# Patient Record
Sex: MALE | Race: BLACK OR AFRICAN AMERICAN | NOT HISPANIC OR LATINO | Employment: FULL TIME | URBAN - METROPOLITAN AREA
[De-identification: names, ages, dates, MRNs, and addresses within clinical notes are randomized per-mention and may not be internally consistent; named-entity substitution may affect disease eponyms.]

---

## 2018-08-07 ENCOUNTER — OFFICE VISIT (OUTPATIENT)
Dept: FAMILY MEDICINE | Facility: CLINIC | Age: 33
End: 2018-08-07
Payer: COMMERCIAL

## 2018-08-07 VITALS
HEIGHT: 64 IN | SYSTOLIC BLOOD PRESSURE: 118 MMHG | WEIGHT: 141 LBS | DIASTOLIC BLOOD PRESSURE: 74 MMHG | TEMPERATURE: 98.2 F | BODY MASS INDEX: 24.07 KG/M2 | HEART RATE: 68 BPM

## 2018-08-07 DIAGNOSIS — Z11.4 SCREENING FOR HIV (HUMAN IMMUNODEFICIENCY VIRUS): ICD-10-CM

## 2018-08-07 DIAGNOSIS — Z00.01 ENCOUNTER FOR ROUTINE ADULT HEALTH EXAMINATION WITH ABNORMAL FINDINGS: Primary | ICD-10-CM

## 2018-08-07 DIAGNOSIS — Z13.1 SCREENING FOR DIABETES MELLITUS: ICD-10-CM

## 2018-08-07 DIAGNOSIS — K21.00 GASTROESOPHAGEAL REFLUX DISEASE WITH ESOPHAGITIS: ICD-10-CM

## 2018-08-07 DIAGNOSIS — Z13.220 LIPID SCREENING: ICD-10-CM

## 2018-08-07 PROCEDURE — 80061 LIPID PANEL: CPT | Performed by: FAMILY MEDICINE

## 2018-08-07 PROCEDURE — 36415 COLL VENOUS BLD VENIPUNCTURE: CPT | Performed by: FAMILY MEDICINE

## 2018-08-07 PROCEDURE — 99395 PREV VISIT EST AGE 18-39: CPT | Performed by: FAMILY MEDICINE

## 2018-08-07 PROCEDURE — 82947 ASSAY GLUCOSE BLOOD QUANT: CPT | Performed by: FAMILY MEDICINE

## 2018-08-07 PROCEDURE — 87389 HIV-1 AG W/HIV-1&-2 AB AG IA: CPT | Performed by: FAMILY MEDICINE

## 2018-08-07 ASSESSMENT — ENCOUNTER SYMPTOMS
ARTHRALGIAS: 0
HEMATURIA: 0
HEMATOCHEZIA: 0
CHILLS: 0
FEVER: 0
WEAKNESS: 0
PARESTHESIAS: 0
DIARRHEA: 0
NERVOUS/ANXIOUS: 0
EYE PAIN: 0
MYALGIAS: 0
DIZZINESS: 0
HEADACHES: 0
PALPITATIONS: 0
FREQUENCY: 0
COUGH: 0
NAUSEA: 0
SHORTNESS OF BREATH: 0
SORE THROAT: 0
JOINT SWELLING: 0
ABDOMINAL PAIN: 0
DYSURIA: 0

## 2018-08-07 NOTE — LETTER
[facility]  [address] [place] [id number]  [phone]                                                                                                August 13, 2018    Ric Chendotjonathan  [facility] [address] [place] [address]  [place] [address]        Dear Mr. Izaguirre,    Your recent lab results were within normal limits. A copy of those results are included with this letter.      Sincerely,      Yesenia Silva, DO/hl    Results for orders placed or performed in visit on 08/07/18   HIV Screening   Result Value Ref Range    HIV Antigen Antibody Combo Nonreactive NR^Nonreactive       Lipid panel reflex to direct LDL Fasting   Result Value Ref Range    Cholesterol 119 <200 mg/dL    Triglycerides 55 <150 mg/dL    HDL Cholesterol 42 >39 mg/dL    LDL Cholesterol Calculated 66 <100 mg/dL    Non HDL Cholesterol 77 <130 mg/dL   Glucose   Result Value Ref Range    Glucose 80 70 - 99 mg/dL

## 2018-08-07 NOTE — PATIENT INSTRUCTIONS
Preventive Health Recommendations  Male Ages 26 - 39    Yearly exam:             See your health care provider every year in order to  o   Review health changes.   o   Discuss preventive care.    o   Review your medicines if your doctor has prescribed any.    You should be tested each year for STDs (sexually transmitted diseases), if you re at risk.     After age 35, talk to your provider about cholesterol testing. If you are at risk for heart disease, have your cholesterol tested at least every 5 years.     If you are at risk for diabetes, you should have a diabetes test (fasting glucose).  Shots: Get a flu shot each year. Get a tetanus shot every 10 years.     Nutrition:    Eat at least 5 servings of fruits and vegetables daily.     Eat whole-grain bread, whole-wheat pasta and brown rice instead of white grains and rice.     Get adequate Calcium and Vitamin D.     Lifestyle    Exercise for at least 150 minutes a week (30 minutes a day, 5 days a week). This will help you control your weight and prevent disease.     Limit alcohol to one drink per day.     No smoking.     Wear sunscreen to prevent skin cancer.     See your dentist every six months for an exam and cleaning.   [facility]   Discharged by : Mickie GARSIA CMA ([facility])    Paper scripts provided to patient : none      If you have any questions regarding your visit please contact your care team:     Team Gold                Clinic Hours Telephone Number     Dr. Raine Hannah, CNP 7am-7pm  Monday - Thursday   7am-5pm  Fridays  [phone]   (Appointment scheduling available 24/7)     RN Line  [phone] option 2     Urgent Care - [place] - 11am-9pm Monday-Friday Saturday-Sunday- 9am-5pm     [place] -   5pm-9pm Monday-Friday Saturday-Sunday- 9am-5pm    [phone] - Bela Aquino    [phone] - [place]       For a Price Quote for your  services, please call our Consumer Price Line at [phone].     What options do I have for visits at the clinic other than the traditional office visit?     To expand how we care for you, many of our providers are utilizing electronic visits (e-visits) and telephone visits, when medically appropriate, for interactions with their patients rather than a visit in the clinic. We also offer nurse visits for many medical concerns. Just like any other service, we will bill your insurance company for this type of visit based on time spent on the phone with your provider. Not all insurance companies cover these visits. Please check with your medical insurance if this type of visit is covered. You will be responsible for any charges that are not paid by your insurance.   E-visits via [employer]: generally incur a $35.00 fee.     Telephone visits:  Time spent on the phone: *charged based on time that is spent on the phone in increments of 10 minutes. Estimated cost:   5-10 mins $30.00   11-20 mins. $59.00   21-30 mins. $85.00       Use [employer] (secure email communication and access to your chart) to send your primary care provider a message or make an appointment. Ask someone on your Team how to sign up for [employer].     As always, Thank you for trusting us with your health care needs!      [facility] [place] [facility]:    Scheduling Appointments  [place] [place]  Call: [phone]    [facility]  Call: [phone]    [facility]  Call: [phone]    For Gastroenterology referrals   [facility] Gastroenterology   Clinics and Surgery Center, 4th Floor   [address] [place] [address]   Appointments: [phone]    WHERE TO GO FOR CARE?  Clinic    Make an appointment if you:       Are sick (cold, cough, flu, sore throat, earache or in pain).       Have a small injury (sprain, small cut, burn or broken bone).       Need a physical exam, Pap smear,  vaccine or prescription refill.       Have questions about your health or medicines.    To reach us:      Call [phone] ([phone]). Open 24 hours every day. (For counseling services, call [phone].)    Log into Masala at [employer]. (Visit [email or web address] to create an account.) Hospital emergency room    An emergency is a serious or life- threatening problem that must be treated right away.    Call 911 or get to the hospital if you have:      Very bad or sudden:            - Chest pain or pressure         - Bleeding         - Head or belly pain         - Dizziness or trouble seeing, walking or                          Speaking      Problems breathing      Blood in your vomit or you are coughing up blood      A major injury (knocked out, loss of a finger or limb, rape, broken bone protruding from skin)    A mental health crisis. (Or call the Mental Health Crisis line at 1-720.116.2523 or Suicide Prevention Hotline at 1-682.564.8999.)    Open 24 hours every day. You don't need an appointment.     Urgent care    Visit urgent care for sickness or small injuries when the clinic is closed. You don't need an appointment. To check hours or find an urgent care near you, visit [email or web address]. Online care    Get online care from [facility] for more than 70 common problems, like colds, allergies and infections. Open 24 hours every day at:   [email or web address]   Need help deciding?    For advice about where to be seen, you may call your clinic and ask to speak with a nurse. We're here for you 24 hours every day.         If you are deaf or hard of hearing, please let us know. We provide many free services including sign language interpreters, oral interpreters, TTYs, telephone amplifiers, note takers and written materials.

## 2018-08-07 NOTE — MR AVS SNAPSHOT
After Visit Summary   8/7/2018    Ric Izaguirre    MRN: [id number]           Patient Information     Date Of Birth          1985        Visit Information        Provider Department      8/7/2018 12:00 PM Yesenia Silva DO [facility]        Today's Diagnoses     Screening for HIV (human immunodeficiency virus)    -  1    Encounter for routine adult health examination with abnormal findings        Gastroesophageal reflux disease with esophagitis        Lipid screening        Screening for diabetes mellitus          Care Instructions      Preventive Health Recommendations  Male Ages 26 - 39    Yearly exam:             See your health care provider every year in order to  o   Review health changes.   o   Discuss preventive care.    o   Review your medicines if your doctor has prescribed any.    You should be tested each year for STDs (sexually transmitted diseases), if you re at risk.     After age 35, talk to your provider about cholesterol testing. If you are at risk for heart disease, have your cholesterol tested at least every 5 years.     If you are at risk for diabetes, you should have a diabetes test (fasting glucose).  Shots: Get a flu shot each year. Get a tetanus shot every 10 years.     Nutrition:    Eat at least 5 servings of fruits and vegetables daily.     Eat whole-grain bread, whole-wheat pasta and brown rice instead of white grains and rice.     Get adequate Calcium and Vitamin D.     Lifestyle    Exercise for at least 150 minutes a week (30 minutes a day, 5 days a week). This will help you control your weight and prevent disease.     Limit alcohol to one drink per day.     No smoking.     Wear sunscreen to prevent skin cancer.     See your dentist every six months for an exam and cleaning.   [facility]   Discharged by : Mickie GARSIA CMA ([facility])    Paper scripts provided to patient : none      If you have any questions regarding your  visit please contact your care team:     Team Gold                Clinic Hours Telephone Number     Dr. Raine Hannah, CNP 7am-7pm  Monday - Thursday   7am-5pm  Fridays  [phone]   (Appointment scheduling available 24/7)     RN Line  [phone] option 2     [facility] [place] [facility] - 11am-9pm Monday-Friday Saturday-Sunday- 9am-5pm     [place] -   5pm-9pm Monday-Friday Saturday-Sunday- 9am-5pm    [phone] - [place] Aquino    [phone] - [place]       For a Price Quote for your services, please call our Consumer Price Line at [phone].     What options do I have for visits at the clinic other than the traditional office visit?     To expand how we care for you, many of our providers are utilizing electronic visits (e-visits) and telephone visits, when medically appropriate, for interactions with their patients rather than a visit in the clinic. We also offer nurse visits for many medical concerns. Just like any other service, we will bill your insurance company for this type of visit based on time spent on the phone with your provider. Not all insurance companies cover these visits. Please check with your medical insurance if this type of visit is covered. You will be responsible for any charges that are not paid by your insurance.   E-visits via EZ-Ticket: generally incur a $35.00 fee.     Telephone visits:  Time spent on the phone: *charged based on time that is spent on the phone in increments of 10 minutes. Estimated cost:   5-10 mins $30.00   11-20 mins. $59.00   21-30 mins. $85.00       Use ShangPinhart (secure email communication and access to your chart) to send your primary care provider a message or make an appointment. Ask someone on your Team how to sign up for EZ-Ticket.     As always, Thank you for trusting us with your health care needs!      [facility]:    Scheduling  Appointments  [facility] W[place], [facility]  Call: [phone]    [facility]  Call: [phone]    [facility]  Call: [phone]    For Gastroenterology referrals   [facility]astroenter[facility]   Clinics and Surgery Center, 4th Floor   [address] [place] [address]   Appointments: [phone]    WHERE TO GO FOR CARE?  Clinic    Make an appointment if you:       Are sick (cold, cough, flu, sore throat, earache or in pain).       Have a small injury (sprain, small cut, burn or broken bone).       Need a physical exam, Pap smear, vaccine or prescription refill.       Have questions about your health or medicines.    To reach us:      Call [phone] ([phone]). Open 24 hours every day. (For counseling services, call [phone].)    Log into Frontstart at [employer]. (Visit [employer] to create an account.) Hospital emergency room    An emergency is a serious or life- threatening problem that must be treated right away.    Call 580 or get to the hospital if you have:      Very bad or sudden:            - Chest pain or pressure         - Bleeding         - Head or belly pain         - Dizziness or trouble seeing, walking or                          Speaking      Problems breathing      Blood in your vomit or you are coughing up blood      A major injury (knocked out, loss of a finger or limb, rape, broken bone protruding from skin)    A mental health crisis. (Or call the Mental Health Crisis line at 1-598.895.2097 or Suicide Prevention Hotline at 1-633.137.8631.)    Open 24 hours every day. You don't need an appointment.     Urgent care    Visit urgent care for sickness or small injuries when the clinic is closed. You don't need an appointment. To check hours or find an urgent care near you, visit [email or web address]. Online care    Get online care from [facility] for more than 70 common problems, like colds, allergies and infections.  "Open 24 hours every day at:   [email or web address]   Need help deciding?    For advice about where to be seen, you may call your clinic and ask to speak with a nurse. We're here for you 24 hours every day.         If you are deaf or hard of hearing, please let us know. We provide many free services including sign language interpreters, oral interpreters, TTYs, telephone amplifiers, note takers and written materials.                   Follow-ups after your visit        Who to contact     If you have questions or need follow up information about today's clinic visit or your schedule please contact [facility] directly at [phone].  Normal or non-critical lab and imaging results will be communicated to you by MyChart, letter or phone within 4 business days after the clinic has received the results. If you do not hear from us within 7 days, please contact the clinic through [email or web address] or phone. If you have a critical or abnormal lab result, we will notify you by phone as soon as possible.  Submit refill requests through FoodBuzz or call your pharmacy and they will forward the refill request to us. Please allow 3 business days for your refill to be completed.          Additional Information About Your Visit        FoodBuzz Information     FoodBuzz lets you send messages to your doctor, view your test results, renew your prescriptions, schedule appointments and more. To sign up, go to [email or web address] . Click on \"Log in\" on the left side of the screen, which will take you to the Welcome page. Then click on \"Sign up Now\" on the right side of the page.     You will be asked to enter the access code listed below, as well as some personal information. Please follow the directions to create your username and password.     Your access code is: [email or web address]  Expires: 2018 11:44 AM     Your access code will  in 90 days. If you need help or a new code, please call your [facility] or [phone].   " "     Care EveryWhere ID     This is your Care EveryWhere ID. This could be used by other organizations to access your [place] medical records  [id number]        Your Vitals Were     Pulse Temperature Height BMI (Body Mass Index)          68 98.2  F (36.8  C) (Oral) 5' 4\" (1.626 m) 24.2 kg/m2         Blood Pressure from Last 3 Encounters:   08/07/18 118/74   06/29/16 104/72    Weight from Last 3 Encounters:   08/07/18 141 lb (64 kg)   06/29/16 133 lb 4 oz (60.4 kg)              We Performed the Following     Glucose     HIV Screening     Lipid panel reflex to direct LDL Fasting        Primary Care Provider Office Phone # Fax #    Yesenia Silva [phone]       [address] [facility] [address]        Equal Access to Services     PRISCILLA KILLIAN : Hadii aad ku hadashlaura Sodiogenes, waaxda luqadaha, qaybta kaalmada adedeepyada, robin stein . So [facility] [phone].    ATENCIÓN: Si habla español, tiene a ogden disposición servicios gratuitos de asistencia lingüística. Llame al [phone].    We comply with applicable federal civil rights laws and [place] laws. We do not discriminate on the basis of race, color, national origin, age, disability, sex, sexual orientation, or gender identity.            Thank you!     Thank you for choosing [facility]  for your care. Our goal is always to provide you with excellent care. Hearing back from our patients is one way we can continue to improve our services. Please take a few minutes to complete the written survey that you may receive in the mail after your visit with us. Thank you!             Your Updated Medication List - Protect others around you: Learn how to safely use, store and throw away your medicines at www.disposemymeds.org.      Notice  As of 8/7/2018 12:26 PM    You have not been prescribed any medications.      "

## 2018-08-07 NOTE — PROGRESS NOTES
SUBJECTIVE:   CC: Ric Izaguirre is an 33 year old male who presents for preventative health visit.     Physical   Annual:     Getting at least 3 servings of Calcium per day:  Yes    Bi-annual eye exam:  NO    Dental care twice a year:  Yes    Sleep apnea or symptoms of sleep apnea:  None    Diet:  Regular (no restrictions)    Frequency of exercise:  2-3 days/week    Duration of exercise:  45-60 minutes    Taking medications regularly:  Yes    Medication side effects:  None    Additional concerns today:  YES        Other Concerns:  New to this provider  Acid reflux symptoms-- no relief with otc meds  When he eats too much he gets symptoms  He has been eating junk foods  Eating late night     Works over night , 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 , he snacks more  Fast foods at times  Lot of energy drinks, no caffeine  No ibuprofen, tomato, spicy foods    No tarry stools, no red stools      Today's PHQ-2 Score:   PHQ-2 ( 1999 Pfizer) 8/7/2018   Q1: Little interest or pleasure in doing things 0   Q2: Feeling down, depressed or hopeless 0   PHQ-2 Score 0   Q1: Little interest or pleasure in doing things Not at all   Q2: Feeling down, depressed or hopeless Not at all   PHQ-2 Score 0       Abuse: Current or Past(Physical, Sexual or Emotional)- No  Do you feel safe in your environment - Yes    Social History   Substance Use Topics     Smoking status: Never Smoker     Smokeless tobacco: Never Used     Alcohol use No     Alcohol Use 8/7/2018   If you drink alcohol do you typically have greater than 3 drinks per day OR greater than 7 drinks per week? No   No flowsheet data found.    Last PSA: No results found for: PSA    Reviewed orders with patient. Reviewed health maintenance and updated orders accordingly - Yes  Labs reviewed in EPIC    Reviewed and updated as needed this visit by clinical staff  Tobacco  Allergies  Meds  Med Hx  Surg Hx  Fam Hx  Soc Hx        Reviewed and updated as needed this visit by Provider        Past  "Medical History:   Diagnosis Date     NO ACTIVE PROBLEMS       Past Surgical History:   Procedure Laterality Date     NO HISTORY OF SURGERY              Review of Systems   Constitutional: Negative for chills and fever.   HENT: Negative for congestion, ear pain, hearing loss and sore throat.    Eyes: Negative for pain and visual disturbance.   Respiratory: Negative for cough and shortness of breath.    Cardiovascular: Negative for chest pain, palpitations and peripheral edema.   Gastrointestinal: Negative for abdominal pain, diarrhea, hematochezia and nausea.   Genitourinary: Negative for discharge, dysuria, frequency, genital sores, hematuria, impotence and urgency.   Musculoskeletal: Negative for arthralgias, joint swelling and myalgias.   Skin: Negative for rash.   Neurological: Negative for dizziness, weakness, headaches and paresthesias.   Psychiatric/Behavioral: Negative for mood changes. The patient is not nervous/anxious.      CONSTITUTIONAL: NEGATIVE for fever, chills, change in weight  INTEGUMENTARY/SKIN: NEGATIVE for worrisome rashes, moles or lesions  EYES: NEGATIVE for vision changes or irritation  ENT: NEGATIVE for ear, mouth and throat problems  RESP: NEGATIVE for significant cough or SOB  CV: NEGATIVE for chest pain, palpitations or peripheral edema  GI: NEGATIVE for nausea, abdominal pain, heartburn, or change in bowel habits   male: negative for dysuria, hematuria, decreased urinary stream, erectile dysfunction, urethral discharge  MUSCULOSKELETAL: NEGATIVE for significant arthralgias or myalgia  NEURO: NEGATIVE for weakness, dizziness or paresthesias  PSYCHIATRIC: NEGATIVE for changes in mood or affect    OBJECTIVE:   /74 (BP Location: Right arm, Patient Position: Sitting, Cuff Size: Adult Regular)  Pulse 68  Temp 98.2  F (36.8  C) (Oral)  Ht 5' 4\" (1.626 m)  Wt 141 lb (64 kg)  BMI 24.2 kg/m2    Physical Exam  GENERAL: healthy, alert and no distress  EYES: Eyes grossly normal to " "inspection, PERRL and conjunctivae and sclerae normal  HENT: ear canals and TM's normal, nose and mouth without ulcers or lesions  NECK: no adenopathy, no asymmetry, masses, or scars and thyroid normal to palpation  RESP: lungs clear to auscultation - no rales, rhonchi or wheezes  CV: regular rate and rhythm, normal S1 S2, no S3 or S4, no murmur, click or rub, no peripheral edema and peripheral pulses strong  ABDOMEN: soft, nontender, no hepatosplenomegaly, no masses and bowel sounds normal  MS: no gross musculoskeletal defects noted, no edema  SKIN: no suspicious lesions or rashes  NEURO: Normal strength and tone, mentation intact and speech normal  PSYCH: mentation appears normal, affect normal/bright    Diagnostic Test Results:  none     ASSESSMENT/PLAN:       ICD-10-CM    1. Screening for HIV (human immunodeficiency virus) Z11.4 HIV Screening   2. Encounter for routine adult health examination with abnormal findings Z00.01    3. Gastroesophageal reflux disease with esophagitis K21.0    4. Lipid screening Z13.220 Lipid panel reflex to direct LDL Fasting   5. Screening for diabetes mellitus Z13.1 Glucose   GERD-advised lifestyle changes, PPI if not resolving  Patient at the end of visit asked if covered with insurance , I did tell him that usually complaints are not covered, so declined to talk more about sx and treatment options  Patient does not want to be charged additionally   COUNSELING:   Reviewed preventive health counseling, as reflected in patient instructions    BP Readings from Last 1 Encounters:   08/07/18 118/74     Estimated body mass index is 24.2 kg/(m^2) as calculated from the following:    Height as of this encounter: 5' 4\" (1.626 m).    Weight as of this encounter: 141 lb (64 kg).           reports that he has never smoked. He has never used smokeless tobacco.      Counseling Resources:  ATP IV Guidelines  Pooled Cohorts Equation Calculator  FRAX Risk Assessment  ICSI Preventive " Guidelines  Dietary Guidelines for Americans, 2010  USDA's MyPlate  ASA Prophylaxis  Lung CA Screening    Yesenia Silva DO  [facility]

## 2018-08-08 LAB
CHOLEST SERPL-MCNC: 119 MG/DL
GLUCOSE SERPL-MCNC: 80 MG/DL (ref 70–99)
HDLC SERPL-MCNC: 42 MG/DL
HIV 1+2 AB+HIV1 P24 AG SERPL QL IA: NONREACTIVE
LDLC SERPL CALC-MCNC: 66 MG/DL
NONHDLC SERPL-MCNC: 77 MG/DL
TRIGL SERPL-MCNC: 55 MG/DL

## 2018-08-28 ENCOUNTER — HOSPITAL ENCOUNTER (EMERGENCY)
Facility: CLINIC | Age: 33
Discharge: HOME | End: 2018-08-29
Attending: FAMILY MEDICINE | Admitting: FAMILY MEDICINE
Payer: COMMERCIAL

## 2018-08-28 ENCOUNTER — NURSE TRIAGE (OUTPATIENT)
Dept: NURSING | Facility: CLINIC | Age: 33
End: 2018-08-28

## 2018-08-28 ENCOUNTER — OFFICE VISIT (OUTPATIENT)
Dept: FAMILY MEDICINE | Facility: CLINIC | Age: 33
End: 2018-08-28
Payer: COMMERCIAL

## 2018-08-28 VITALS
TEMPERATURE: 97.4 F | OXYGEN SATURATION: 98 % | HEART RATE: 93 BPM | DIASTOLIC BLOOD PRESSURE: 65 MMHG | BODY MASS INDEX: 24.72 KG/M2 | SYSTOLIC BLOOD PRESSURE: 112 MMHG | WEIGHT: 144 LBS

## 2018-08-28 DIAGNOSIS — L02.91 ABSCESS: Primary | ICD-10-CM

## 2018-08-28 DIAGNOSIS — R07.89 NON-CARDIAC CHEST PAIN: Primary | ICD-10-CM

## 2018-08-28 DIAGNOSIS — K12.2 CELLULITIS OF MOUTH: ICD-10-CM

## 2018-08-28 LAB
ANION GAP SERPL CALCULATED.3IONS-SCNC: 9 MMOL/L (ref 3–14)
BASOPHILS # BLD AUTO: 0 10E9/L (ref 0–0.2)
BASOPHILS NFR BLD AUTO: 0.4 %
BUN SERPL-MCNC: 10 MG/DL (ref 7–30)
CALCIUM SERPL-MCNC: 8.3 MG/DL (ref 8.5–10.1)
CHLORIDE SERPL-SCNC: 110 MMOL/L (ref 94–109)
CO2 SERPL-SCNC: 26 MMOL/L (ref 20–32)
CREAT SERPL-MCNC: 0.88 MG/DL (ref 0.66–1.25)
D DIMER PPP FEU-MCNC: 0.5 UG/ML FEU (ref 0–0.5)
DIFFERENTIAL METHOD BLD: ABNORMAL
EOSINOPHIL # BLD AUTO: 0.2 10E9/L (ref 0–0.7)
EOSINOPHIL NFR BLD AUTO: 2.3 %
ERYTHROCYTE [DISTWIDTH] IN BLOOD BY AUTOMATED COUNT: 13.4 % (ref 10–15)
GFR SERPL CREATININE-BSD FRML MDRD: >90 ML/MIN/1.7M2
GLUCOSE SERPL-MCNC: 117 MG/DL (ref 70–99)
HCT VFR BLD AUTO: 42.1 % (ref 40–53)
HGB BLD-MCNC: 13.6 G/DL (ref 13.3–17.7)
IMM GRANULOCYTES # BLD: 0 10E9/L (ref 0–0.4)
IMM GRANULOCYTES NFR BLD: 0.4 %
LYMPHOCYTES # BLD AUTO: 2.3 10E9/L (ref 0.8–5.3)
LYMPHOCYTES NFR BLD AUTO: 27.1 %
MCH RBC QN AUTO: 26.4 PG (ref 26.5–33)
MCHC RBC AUTO-ENTMCNC: 32.3 G/DL (ref 31.5–36.5)
MCV RBC AUTO: 82 FL (ref 78–100)
MONOCYTES # BLD AUTO: 0.7 10E9/L (ref 0–1.3)
MONOCYTES NFR BLD AUTO: 8 %
NEUTROPHILS # BLD AUTO: 5.2 10E9/L (ref 1.6–8.3)
NEUTROPHILS NFR BLD AUTO: 61.8 %
NRBC # BLD AUTO: 0 10*3/UL
NRBC BLD AUTO-RTO: 0 /100
PLATELET # BLD AUTO: 181 10E9/L (ref 150–450)
POTASSIUM SERPL-SCNC: 4 MMOL/L (ref 3.4–5.3)
RBC # BLD AUTO: 5.15 10E12/L (ref 4.4–5.9)
SODIUM SERPL-SCNC: 145 MMOL/L (ref 133–144)
TROPONIN I SERPL-MCNC: <0.015 UG/L (ref 0–0.04)
WBC # BLD AUTO: 8.3 10E9/L (ref 4–11)

## 2018-08-28 PROCEDURE — 93010 ELECTROCARDIOGRAM REPORT: CPT | Mod: Z6 | Performed by: FAMILY MEDICINE

## 2018-08-28 PROCEDURE — 93005 ELECTROCARDIOGRAM TRACING: CPT | Performed by: FAMILY MEDICINE

## 2018-08-28 PROCEDURE — 80048 BASIC METABOLIC PNL TOTAL CA: CPT | Performed by: FAMILY MEDICINE

## 2018-08-28 PROCEDURE — 85025 COMPLETE CBC W/AUTO DIFF WBC: CPT | Performed by: FAMILY MEDICINE

## 2018-08-28 PROCEDURE — 99284 EMERGENCY DEPT VISIT MOD MDM: CPT | Mod: 25 | Performed by: FAMILY MEDICINE

## 2018-08-28 PROCEDURE — 84484 ASSAY OF TROPONIN QUANT: CPT | Performed by: FAMILY MEDICINE

## 2018-08-28 PROCEDURE — 99213 OFFICE O/P EST LOW 20 MIN: CPT | Performed by: PHYSICIAN ASSISTANT

## 2018-08-28 PROCEDURE — 85379 FIBRIN DEGRADATION QUANT: CPT | Performed by: FAMILY MEDICINE

## 2018-08-28 PROCEDURE — 99284 EMERGENCY DEPT VISIT MOD MDM: CPT | Performed by: FAMILY MEDICINE

## 2018-08-28 RX ORDER — SULFAMETHOXAZOLE/TRIMETHOPRIM 800-160 MG
1 TABLET ORAL 2 TIMES DAILY
Qty: 20 TABLET | Refills: 0 | Status: SHIPPED | OUTPATIENT
Start: 2018-08-28 | End: 2018-08-29

## 2018-08-28 ASSESSMENT — ENCOUNTER SYMPTOMS
DIARRHEA: 0
MYALGIAS: 0
FEVER: 0
SHORTNESS OF BREATH: 0
COUGH: 0
LIGHT-HEADEDNESS: 0
NUMBNESS: 0
ABDOMINAL DISTENTION: 0
ABDOMINAL PAIN: 0
DIZZINESS: 0
DIAPHORESIS: 0
NAUSEA: 0
HEADACHES: 0
PALPITATIONS: 0
ARTHRALGIAS: 0
WEAKNESS: 0
CHILLS: 0

## 2018-08-28 NOTE — PROGRESS NOTES
"  SUBJECTIVE:   Ric Izaguirer is a 33 year old male who presents to clinic today for the following health issues:        Check poss cold sore on lower lip x 3 days,very painful, using vaseline and tylenol   Not changing.  Has never had sores on lips before.  Said he had before but on hip.  Sounds like that was a cyst and had to be drainage.  Did some pus yesterday.  No exposures.  Last saw dentist a month ago.  No fevers but is tired.  No trouble swallowing.  Face is painful on left side.    Did have some tingling before and and started as a small pimple.  At end of visit he says he has had cold sores but this started below lip.          Problem list and histories reviewed & adjusted, as indicated.  Additional history: as documented    There is no problem list on file for this patient.    Past Surgical History:   Procedure Laterality Date     NO HISTORY OF SURGERY         Social History   Substance Use Topics     Smoking status: Never Smoker     Smokeless tobacco: Never Used     Alcohol use No     Family History   Problem Relation Age of Onset     Diabetes No family hx of      Coronary Artery Disease No family hx of      Hypertension No family hx of      Hyperlipidemia No family hx of      Cerebrovascular Disease No family hx of      Breast Cancer No family hx of      Colon Cancer No family hx of      Prostate Cancer No family hx of            Reviewed and updated as needed this visit by clinical staff  Tobacco  Allergies  Meds  Med Hx  Surg Hx  Fam Hx  Soc Hx      Reviewed and updated as needed this visit by Provider         ROS:  As above    OBJECTIVE:     /65  Pulse 93  Temp 97.4  F (36.3  C) (Oral)  Wt 144 lb (65.3 kg)  SpO2 98%  BMI 24.72 kg/m2  Body mass index is 24.72 kg/(m^2).  GENERAL: healthy, alert and no distress  HENT: ear canals and TM's normal, oropharynx clear, oral mucous membranes moist, sinuses: not tender and lower lip is swollen and firm with small \"head\" on area of " fullness and area is painful.  Swelling is most of lower lip   NECK: no adenopathy and no asymmetry, masses, or scars  RESP: lungs clear to auscultation - no rales, rhonchi or wheezes  CV: regular rates and rhythm, normal S1 S2, no S3 or S4 and no murmur, click or rub    Diagnostic Test Results:  none     ASSESSMENT/PLAN:       1. Abscess  Given location and some drainage will treat with antibiotics .    - sulfamethoxazole-trimethoprim (BACTRIM DS) 800-160 MG per tablet; Take 1 tablet by mouth 2 times daily for 10 days  Dispense: 20 tablet; Refill: 0  - OTOLARYNGOLOGY REFERRAL  - acetaminophen-codeine (TYLENOL #3) 300-30 MG per tablet; Take 1 tablet by mouth every 6 hours as needed for severe pain  Dispense: 10 tablet; Refill: 0    Patient Instructions   Use hot packs to face several times a day  If getting worse call and see ENT  If swelling is worse or you get a fever go to ER        Maryse Lynn PA-C  [facility]

## 2018-08-28 NOTE — ED AVS SNAPSHOT
[facility], Emergency Department    [address]    [place] [address]    Phone:  [phone]    Fax:  [phone]                                       Ric Izaguirre   MRN: [id number]    Department:  [facility], Emergency Department   Date of Visit:  8/28/2018           After Visit Summary Signature Page     I have received my discharge instructions, and my questions have been answered. I have discussed any challenges I see with this plan with the nurse or doctor.    ..........................................................................................................................................  Patient/Patient Representative Signature      ..........................................................................................................................................  Patient Representative Print Name and Relationship to Patient    ..................................................               ................................................  Date                                            Time    ..........................................................................................................................................  Reviewed by Signature/Title    ...................................................              ..............................................  Date                                                            Time          22EPIC Rev 08/18

## 2018-08-28 NOTE — PATIENT INSTRUCTIONS
Use hot packs to face several times a day  If getting worse call and see ENT  If swelling is worse or you get a fever go to ER

## 2018-08-28 NOTE — MR AVS SNAPSHOT
After Visit Summary   8/28/2018    Ric Izaguirre    MRN: [id number]           Patient Information     Date Of Birth          1985        Visit Information        Provider Department      8/28/2018 11:40 AM Maryse Lynn PA-C [facility]        Today's Diagnoses     Abscess    -  1      Care Instructions    Use hot packs to face several times a day  If getting worse call and see ENT  If swelling is worse or you get a fever go to ER            Follow-ups after your visit        Additional Services     OTOLARYNGOLOGY REFERRAL       Your provider has referred you to: [facility]: [facility] [phone]   [email or web address]    Please be aware that coverage of these services is subject to the terms and limitations of your health insurance plan.  Call member services at your health plan with any benefit or coverage questions.      Please bring the following with you to your appointment:    (1) Any X-Rays, CTs or MRIs which have been performed.  Contact the facility where they were done to arrange for  prior to your scheduled appointment.   (2) List of current medications  (3) This referral request   (4) Any documents/labs given to you for this referral                  Who to contact     If you have questions or need follow up information about today's clinic visit or your schedule please contact [facility] directly at [phone].  Normal or non-critical lab and imaging results will be communicated to you by MyChart, letter or phone within 4 business days after the clinic has received the results. If you do not hear from us within 7 days, please contact the clinic through MyChart or phone. If you have a critical or abnormal lab result, we will notify you by phone as soon as possible.  Submit refill requests through [employer] or call your pharmacy and they will forward the refill request to us.  "Please allow 3 business days for your refill to be completed.          Additional Information About Your Visit        MyChart Information     [employer] lets you send messages to your doctor, view your test results, renew your prescriptions, schedule appointments and more. To sign up, go to [email or web address] . Click on \"Log in\" on the left side of the screen, which will take you to the Welcome page. Then click on \"Sign up Now\" on the right side of the page.     You will be asked to enter the access code listed below, as well as some personal information. Please follow the directions to create your username and password.     Your access code is: [email or web address]  Expires: 2018 11:44 AM     Your access code will  in 90 days. If you need help or a new code, please call your [facility] clinic or [phone].        Care EveryWhere ID     This is your Care EveryWhere ID. This could be used by other organizations to access your [facility] medical records  [id number]        Your Vitals Were     Pulse Temperature Pulse Oximetry BMI (Body Mass Index)          93 97.4  F (36.3  C) (Oral) 98% 24.72 kg/m2         Blood Pressure from Last 3 Encounters:   18 112/65   18 118/74   16 104/72    Weight from Last 3 Encounters:   18 144 lb (65.3 kg)   18 141 lb (64 kg)   16 133 lb 4 oz (60.4 kg)              We Performed the Following     OTOLARYNGOLOGY REFERRAL          Today's Medication Changes          These changes are accurate as of 18 12:34 PM.  If you have any questions, ask your nurse or doctor.               Start taking these medicines.        Dose/Directions    acetaminophen-codeine 300-30 MG per tablet   Commonly known as:  TYLENOL #3   Used for:  Abscess   Started by:  Maryse Lynn PA-C        Dose:  1 tablet   Take 1 tablet by mouth every 6 hours as needed for severe pain   Quantity:  10 tablet   Refills:  0       sulfamethoxazole-trimethoprim 800-160 MG per " tablet   Commonly known as:  BACTRIM DS   Used for:  Abscess   Started by:  Maryse Lynn PA-C        Dose:  1 tablet   Take 1 tablet by mouth 2 times daily for 10 days   Quantity:  20 tablet   Refills:  0            Where to get your medicines      These medications were sent to [facility] [address]  [address] [place] [address]     Phone:  [phone]     sulfamethoxazole-trimethoprim 800-160 MG per tablet         Some of these will need a paper prescription and others can be bought over the counter.  Ask your nurse if you have questions.     Bring a paper prescription for each of these medications     acetaminophen-codeine 300-30 MG per tablet               Information about OPIOIDS     PRESCRIPTION OPIOIDS: WHAT YOU NEED TO KNOW   We gave you an opioid (narcotic) pain medicine. It is important to manage your pain, but opioids are not always the best choice. You should first try all the other options your care team gave you. Take this medicine for as short a time (and as few doses) as possible.    Some activities can increase your pain, such as bandage changes or therapy sessions. It may help to take your pain medicine 30 to 60 minutes before these activities. Reduce your stress by getting enough sleep, working on hobbies you enjoy and practicing relaxation or meditation. Talk to your care team about ways to manage your pain beyond prescription opioids.    These medicines have risks:    DO NOT drive when on new or higher doses of pain medicine. These medicines can affect your alertness and reaction times, and you could be arrested for driving under the influence (DUI). If you need to use opioids long-term, talk to your care team about driving.    DO NOT operate heavy machinery    DO NOT do any other dangerous activities while taking these medicines.    DO NOT drink any alcohol while taking these medicines.     If the  opioid prescribed includes acetaminophen, DO NOT take with any other medicines that contain acetaminophen. Read all labels carefully. Look for the word  acetaminophen  or  Tylenol.  Ask your pharmacist if you have questions or are unsure.    You can get addicted to pain medicines, especially if you have a history of addiction (chemical, alcohol or substance dependence). Talk to your care team about ways to reduce this risk.    All opioids tend to cause constipation. Drink plenty of water and eat foods that have a lot of fiber, such as fruits, vegetables, prune juice, apple juice and high-fiber cereal. Take a laxative (Miralax, milk of magnesia, Colace, Senna) if you don t move your bowels at least every other day. Other side effects include upset stomach, sleepiness, dizziness, throwing up, tolerance (needing more of the medicine to have the same effect), physical dependence and slowed breathing.    Store your pills in a secure place, locked if possible. We will not replace any lost or stolen medicine. If you don t finish your medicine, please throw away (dispose) as directed by your pharmacist. The [place] Pollution Control Agency has more information about safe disposal: https://www.pca.[facility].[place].us/living-green/managing-unwanted-medications         Primary Care Provider Office Phone # Fax #    Yesenia Silva  [phone]       [address] [place] [address]        Equal Access to Services     PRISCILLA KILLIAN : Hadivone campbell Sodiogenes, waaxda luqadaha, qaybta kaalrobin garcia. So [facility] [phone].    ATENCIÓN: Si habla español, tiene a ogden disposición servicios gratuitos de asistencia lingüística. Feliz thakkar [phone].    We comply with applicable federal civil rights laws and [place] laws. We do not discriminate on the basis of race, color, national origin, age, disability, sex, sexual orientation, or gender identity.             Thank you!     Thank you for choosing [facility]  for your care. Our goal is always to provide you with excellent care. Hearing back from our patients is one way we can continue to improve our services. Please take a few minutes to complete the written survey that you may receive in the mail after your visit with us. Thank you!             Your Updated Medication List - Protect others around you: Learn how to safely use, store and throw away your medicines at www.disposemymeds.org.          This list is accurate as of 8/28/18 12:34 PM.  Always use your most recent med list.                   Brand Name Dispense Instructions for use Diagnosis    acetaminophen-codeine 300-30 MG per tablet    TYLENOL #3    10 tablet    Take 1 tablet by mouth every 6 hours as needed for severe pain    Abscess       sulfamethoxazole-trimethoprim 800-160 MG per tablet    BACTRIM DS    20 tablet    Take 1 tablet by mouth 2 times daily for 10 days    Abscess          Clear

## 2018-08-28 NOTE — LETTER
August 28, 2018      Ric Chencaitlyn  [address]  [place] [address]        To Whom It May Concern,      Please excuse Ric from work until 8/30/18.          Sincerely,        Maryse Lynn PA-C

## 2018-08-28 NOTE — ED AVS SNAPSHOT
[facility], Emergency Department    [address]    [place] [address]    Phone:  [phone]    Fax:  [phone]                                       Ric Izaguirre   MRN: [id number]    Department:  [facility], Emergency Department   Date of Visit:  8/28/2018           Patient Information     Date Of Birth          1985        Your diagnoses for this visit were:     Non-cardiac chest pain        You were seen by Jsoe Holman MD.        Discharge Instructions          Please make an appointment to follow up with [facility] (phone: [phone]) in 2-3 days for further evaluation and treatment of you lip abscess.     *CHEST PAIN, UNCERTAIN CAUSE    Based on your exam today, the exact cause of your chest pain is not certain. Your condition does not seem serious at this time, and your pain does not appear to be coming from your heart. However, sometimes the signs of a serious problem take more time to appear. Therefore, watch for the warning signs listed below.  HOME CARE:    1. Rest today and avoid strenuous activity.  2. Take any prescribed medicine as directed.  FOLLOW UP with your doctor in 1-3 days.   GET PROMPT MEDICAL ATTENTION if any of the following occur:    A change in the type of pain: if it feels different, becomes more severe, lasts longer, or begins to spread into your shoulder, arm, neck, jaw or back    Shortness of breath or increased pain with breathing    Weakness, dizziness, or fainting    Cough with blood or dark colored sputum (phlegm)    Fever over 101  F (38.3  C)    Swelling, pain or redness in one leg    3362-6202 [employer]. [address] [place] [address]. All rights reserved. This information is not intended as a substitute for professional medical care. Always follow your healthcare professional's instructions.  This information has been modified by your health care provider with permission from the  publisher.      24 Hour Appointment Hotline       To make an appointment at any [facility], call [phone] ([phone]). If you don't have a family doctor or clinic, we will help you find one. [facility] clinics are conveniently located to serve the needs of you and your family.             Review of your medicines      START taking        Dose / Directions Last dose taken    amoxicillin-clavulanate 875-125 MG per tablet   Commonly known as:  AUGMENTIN   Dose:  1 tablet   Quantity:  10 tablet        Take 1 tablet by mouth 2 times daily for 5 days   Refills:  0          Our records show that you are taking the medicines listed below. If these are incorrect, please call your family doctor or clinic.        Dose / Directions Last dose taken    acetaminophen-codeine 300-30 MG per tablet   Commonly known as:  TYLENOL #3   Dose:  1 tablet   Quantity:  10 tablet        Take 1 tablet by mouth every 6 hours as needed for severe pain   Refills:  0          STOP taking        Dose Reason for stopping Comments    sulfamethoxazole-trimethoprim 800-160 MG per tablet   Commonly known as:  BACTRIM DS                      Prescriptions were sent or printed at these locations (1 Prescription)                   Other Prescriptions                Printed at Department/Unit printer (1 of 1)         amoxicillin-clavulanate (AUGMENTIN) 875-125 MG per tablet                Procedures and tests performed during your visit     Basic metabolic panel    CBC with platelets differential    D dimer quantitative    EKG 12 lead    EKG 12-lead, tracing only    Troponin I    XR Chest 2 Views      Orders Needing Specimen Collection     None      Pending Results     Date and Time Order Name Status Description    8/28/2018 2305 XR Chest 2 Views Preliminary     8/28/2018 2250 EKG 12 lead Preliminary             Pending Culture Results     No orders found for last 3 day(s).            Pending Results Instructions     If you had any lab results  "that were not finalized at the time of your Discharge, you can call the ED Lab Result RN at [phone]. You will be contacted by this team for any positive Lab results or changes in treatment. The nurses are available 7 days a week from 10A to 6:30P.  You can leave a message 24 hours per day and they will return your call.        Thank you for choosing [facility]       Thank you for choosing [facility] for your care. Our goal is always to provide you with excellent care. Hearing back from our patients is one way we can continue to improve our services. Please take a few minutes to complete the written survey that you may receive in the mail after you visit with us. Thank you!        [employer] Information     [employer] lets you send messages to your doctor, view your test results, renew your prescriptions, schedule appointments and more. To sign up, go to [email or web address] . Click on \"Log in\" on the left side of the screen, which will take you to the Welcome page. Then click on \"Sign up Now\" on the right side of the page.     You will be asked to enter the access code listed below, as well as some personal information. Please follow the directions to create your username and password.     Your access code is: [email or web address]  Expires: 2018 11:44 AM     Your access code will  in 90 days. If you need help or a new code, please call your [facility] clinic or [phone].        Care EveryWhere ID     This is your Care EveryWhere ID. This could be used by other organizations to access your [facility] medical records  [id number]        Equal Access to Services     PARVIN [facility] HospitalNATHAN : Hadii sasha Patel, waramosda luqadaha, qaybta kaalrobin agrcia . So [facility] [phone].    ATENCIÓN: Si habla español, tiene a ogden disposición servicios gratuitos de asistencia lingüística. Llame al [phone].    We comply with applicable federal civil rights laws and [place] laws. We " do not discriminate on the basis of race, color, national origin, age, disability, sex, sexual orientation, or gender identity.            After Visit Summary       This is your record. Keep this with you and show to your community pharmacist(s) and doctor(s) at your next visit.

## 2018-08-29 ENCOUNTER — APPOINTMENT (OUTPATIENT)
Dept: GENERAL RADIOLOGY | Facility: CLINIC | Age: 33
End: 2018-08-29
Attending: FAMILY MEDICINE
Payer: COMMERCIAL

## 2018-08-29 VITALS
SYSTOLIC BLOOD PRESSURE: 122 MMHG | BODY MASS INDEX: 24.98 KG/M2 | RESPIRATION RATE: 16 BRPM | DIASTOLIC BLOOD PRESSURE: 66 MMHG | WEIGHT: 145.5 LBS | HEART RATE: 82 BPM | TEMPERATURE: 97.7 F | OXYGEN SATURATION: 98 %

## 2018-08-29 LAB — INTERPRETATION ECG - MUSE: NORMAL

## 2018-08-29 PROCEDURE — 71046 X-RAY EXAM CHEST 2 VIEWS: CPT

## 2018-08-29 NOTE — DISCHARGE INSTRUCTIONS
Please make an appointment to follow up with [facility] Surgery Clinic (phone: [phone]) in 2-3 days for further evaluation and treatment of you lip abscess.     *CHEST PAIN, UNCERTAIN CAUSE    Based on your exam today, the exact cause of your chest pain is not certain. Your condition does not seem serious at this time, and your pain does not appear to be coming from your heart. However, sometimes the signs of a serious problem take more time to appear. Therefore, watch for the warning signs listed below.  HOME CARE:    1. Rest today and avoid strenuous activity.  2. Take any prescribed medicine as directed.  FOLLOW UP with your doctor in 1-3 days.   GET PROMPT MEDICAL ATTENTION if any of the following occur:    A change in the type of pain: if it feels different, becomes more severe, lasts longer, or begins to spread into your shoulder, arm, neck, jaw or back    Shortness of breath or increased pain with breathing    Weakness, dizziness, or fainting    Cough with blood or dark colored sputum (phlegm)    Fever over 101  F (38.3  C)    Swelling, pain or redness in one leg    2043-5941 [employer]. [address] [place], [place] [address]. All rights reserved. This information is not intended as a substitute for professional medical care. Always follow your healthcare professional's instructions.  This information has been modified by your health care provider with permission from the publisher.

## 2018-08-29 NOTE — TELEPHONE ENCOUNTER
C/o chest pain starting within past few hours. Has been present longer than 5 min. States pain feels like something heavy on his chest. Saw doctor today and started abx for abscess. Denies SOB but c/o pain w/ DB. Advised 911 now. Pt declined 911. Disc'd it is best to call 911 so paramedics can come help him right away. Pt still declined 911 but agreed to have someone take him to ED now. Laverne Tyson RN/FNA    Reason for Disposition    [1] Chest pain lasts > 5 minutes AND [2] described as crushing, pressure-like, or heavy    Additional Information    Negative: Severe difficulty breathing (e.g., struggling for each breath, speaks in single words)    Negative: Difficult to awaken or acting confused (e.g., disoriented, slurred speech)    Negative: Shock suspected (e.g., cold/pale/clammy skin, too weak to stand, low BP, rapid pulse)    Negative: [1] Chest pain lasts > 5 minutes AND [2] history of heart disease  (i.e., heart attack, bypass surgery, angina, angioplasty, CHF; not just a heart murmur)    Protocols used: CHEST PAIN-ADULT-AH

## 2018-09-05 ENCOUNTER — TELEPHONE (OUTPATIENT)
Dept: FAMILY MEDICINE | Facility: CLINIC | Age: 33
End: 2018-09-05

## 2018-09-05 NOTE — LETTER
September 5, 2018      Ric Zina  [address]  [place] [address]        To Whom It May Concern,      Please excuse Kelvinsarahmilady from work 8/31-9/2/18.          Sincerely,        Maryse Lynn PA-C

## 2018-09-05 NOTE — TELEPHONE ENCOUNTER
Reason for Call:  Other Patient needs note for work    Detailed comments: Patient got a note for Wednesday and Thursday of last week. He now needs to have another doctors note to cover Friday through Sunday. Patient was still taking antibotics that were prescribed and was unable to return to work Friday - Sunday. Please call with questions. Patient will come in to  note.     Phone Number Patient can be reached at: Cell number on file:    Telephone Information:   Mobile [phone]       Best Time: any    Can we leave a detailed message on this number? YES    Call taken on 9/5/2018 at 10:43 AM by Lety Alvarez

## 2019-08-26 ENCOUNTER — OFFICE VISIT (OUTPATIENT)
Dept: FAMILY MEDICINE | Facility: CLINIC | Age: 34
End: 2019-08-26
Payer: COMMERCIAL

## 2019-08-26 VITALS
WEIGHT: 144 LBS | BODY MASS INDEX: 24.59 KG/M2 | HEIGHT: 64 IN | DIASTOLIC BLOOD PRESSURE: 70 MMHG | SYSTOLIC BLOOD PRESSURE: 110 MMHG | TEMPERATURE: 97.8 F

## 2019-08-26 DIAGNOSIS — Z13.220 SCREENING FOR LIPOID DISORDERS: ICD-10-CM

## 2019-08-26 DIAGNOSIS — Z13.1 SCREENING FOR DIABETES MELLITUS: ICD-10-CM

## 2019-08-26 DIAGNOSIS — Z00.00 ROUTINE GENERAL MEDICAL EXAMINATION AT A HEALTH CARE FACILITY: Primary | ICD-10-CM

## 2019-08-26 DIAGNOSIS — R12 HEART BURN: ICD-10-CM

## 2019-08-26 DIAGNOSIS — Z11.3 SCREEN FOR STD (SEXUALLY TRANSMITTED DISEASE): ICD-10-CM

## 2019-08-26 LAB
ANION GAP SERPL CALCULATED.3IONS-SCNC: 5 MMOL/L (ref 3–14)
BUN SERPL-MCNC: 11 MG/DL (ref 7–30)
CALCIUM SERPL-MCNC: 8.8 MG/DL (ref 8.5–10.1)
CHLORIDE SERPL-SCNC: 106 MMOL/L (ref 94–109)
CHOLEST SERPL-MCNC: 123 MG/DL
CO2 SERPL-SCNC: 28 MMOL/L (ref 20–32)
CREAT SERPL-MCNC: 0.71 MG/DL (ref 0.66–1.25)
GFR SERPL CREATININE-BSD FRML MDRD: >90 ML/MIN/{1.73_M2}
GLUCOSE SERPL-MCNC: 100 MG/DL (ref 70–99)
HDLC SERPL-MCNC: 42 MG/DL
HIV 1+2 AB+HIV1 P24 AG SERPL QL IA: NONREACTIVE
LDLC SERPL CALC-MCNC: 67 MG/DL
NONHDLC SERPL-MCNC: 81 MG/DL
POTASSIUM SERPL-SCNC: 4.4 MMOL/L (ref 3.4–5.3)
SODIUM SERPL-SCNC: 139 MMOL/L (ref 133–144)
TRIGL SERPL-MCNC: 70 MG/DL

## 2019-08-26 PROCEDURE — 36415 COLL VENOUS BLD VENIPUNCTURE: CPT | Performed by: FAMILY MEDICINE

## 2019-08-26 PROCEDURE — 80061 LIPID PANEL: CPT | Performed by: FAMILY MEDICINE

## 2019-08-26 PROCEDURE — 80048 BASIC METABOLIC PNL TOTAL CA: CPT | Performed by: FAMILY MEDICINE

## 2019-08-26 PROCEDURE — 87389 HIV-1 AG W/HIV-1&-2 AB AG IA: CPT | Performed by: FAMILY MEDICINE

## 2019-08-26 PROCEDURE — 99395 PREV VISIT EST AGE 18-39: CPT | Performed by: FAMILY MEDICINE

## 2019-08-26 PROCEDURE — 86780 TREPONEMA PALLIDUM: CPT | Performed by: FAMILY MEDICINE

## 2019-08-26 ASSESSMENT — MIFFLIN-ST. JEOR: SCORE: 1490.21

## 2019-08-26 NOTE — PROGRESS NOTES
SUBJECTIVE:   CC: Ric Izaguirre is an 34 year old male who presents for preventive health visit.     Healthy Habits:    Do you get at least three servings of calcium containing foods daily (dairy, green leafy vegetables, etc.)? yes    Amount of exercise or daily activities, outside of work: job is very physical. Works as a CNA    Problems taking medications regularly not applicable    Medication side effects: No    Have you had an eye exam in the past two years? no    Do you see a dentist twice per year? yes    Do you have sleep apnea, excessive snoring or daytime drowsiness?yes    GERD  Patient reports that his only active problem is heartburn. I recommended Omeprazole at our last appointment, but he is not taking this at this time. He says that he drinks lots of water and this seems to give him some benefit.       Today's PHQ-2 Score:   PHQ-2 ( 1999 Pfizer) 8/7/2018 6/29/2016   Q1: Little interest or pleasure in doing things 0 0   Q2: Feeling down, depressed or hopeless 0 0   PHQ-2 Score 0 0   Q1: Little interest or pleasure in doing things Not at all -   Q2: Feeling down, depressed or hopeless Not at all -   PHQ-2 Score 0 -       Abuse: Current or Past(Physical, Sexual or Emotional)- No  Do you feel safe in your environment? Yes    Social History     Tobacco Use     Smoking status: Never Smoker     Smokeless tobacco: Never Used   Substance Use Topics     Alcohol use: No     If you drink alcohol do you typically have >3 drinks per day or >7 drinks per week? Not Applicable                      Last PSA: No results found for: PSA    Reviewed orders with patient. Reviewed health maintenance and updated orders accordingly - Yes  BP Readings from Last 3 Encounters:   08/26/19 110/70   08/29/18 122/66   08/28/18 112/65    Wt Readings from Last 3 Encounters:   08/26/19 65.3 kg (144 lb)   08/28/18 66 kg (145 lb 8 oz)   08/28/18 65.3 kg (144 lb)           There is no problem list on file for this patient.    Past  Surgical History:   Procedure Laterality Date     NO HISTORY OF SURGERY         Social History     Tobacco Use     Smoking status: Never Smoker     Smokeless tobacco: Never Used   Substance Use Topics     Alcohol use: No     Family History   Problem Relation Age of Onset     Diabetes No family hx of      Coronary Artery Disease No family hx of      Hypertension No family hx of      Hyperlipidemia No family hx of      Cerebrovascular Disease No family hx of      Breast Cancer No family hx of      Colon Cancer No family hx of      Prostate Cancer No family hx of            Reviewed and updated as needed this visit by clinical staff  Tobacco  Allergies  Meds  Med Hx  Surg Hx  Fam Hx  Soc Hx        Reviewed and updated as needed this visit by Provider        Past Medical History:   Diagnosis Date     Abscess     left lower lip     NO ACTIVE PROBLEMS       Past Surgical History:   Procedure Laterality Date     NO HISTORY OF SURGERY         ROS:  CONSTITUTIONAL: NEGATIVE for fever, chills, change in weight  INTEGUMENTARY/SKIN: NEGATIVE for worrisome rashes, moles or lesions  EYES: NEGATIVE for vision changes or irritation  ENT: NEGATIVE for ear, mouth and throat problems  RESP: NEGATIVE for significant cough or SOB  CV: NEGATIVE for chest pain, palpitations or peripheral edema  GI: NEGATIVE for nausea, abdominal pain, or change in bowel habits, POSITIVE for heartburn   male: negative for dysuria, hematuria, decreased urinary stream, erectile dysfunction, urethral discharge  MUSCULOSKELETAL: NEGATIVE for significant arthralgias or myalgia  NEURO: NEGATIVE for weakness, dizziness or paresthesias  PSYCHIATRIC: NEGATIVE for changes in mood or affect    This document serves as a record of the services and decisions personally performed and made by Yesenia Silva DO. It was created on her behalf by Juan Carlos Freire, a trained medical scribe. The creation of this document is based on the provider's statements to  "the medical scribe.  Juan Carlos Freire 7:25 AM August 26, 2019    OBJECTIVE:   /70 (BP Location: Right arm, Patient Position: Sitting, Cuff Size: Adult Regular)   Temp 97.8  F (36.6  C) (Oral)   Ht 1.619 m (5' 3.75\")   Wt 65.3 kg (144 lb)   BMI 24.91 kg/m    EXAM:  GENERAL: healthy, alert and no distress  EYES: Eyes grossly normal to inspection, PERRL and conjunctivae and sclerae normal  HENT: ear canals and TM's normal, nose and mouth without ulcers or lesions  NECK: no adenopathy, no asymmetry, masses, or scars and thyroid normal to palpation  RESP: lungs clear to auscultation - no rales, rhonchi or wheezes  CV: regular rate and rhythm, normal S1 S2, no S3 or S4, no murmur, click or rub, no peripheral edema and peripheral pulses strong  ABDOMEN: soft, nontender, no hepatosplenomegaly, no masses and bowel sounds normal  MS: no gross musculoskeletal defects noted, no edema  SKIN: no suspicious lesions or rashes to visible skin   NEURO: Normal strength and tone, mentation intact and speech normal  PSYCH: mentation appears normal, affect normal/bright    Diagnostic Test Results:  Labs reviewed in Epic  No results found for this or any previous visit (from the past 24 hour(s)).    ASSESSMENT/PLAN:   1. Routine general medical examination at a health care facility  Basically normal exam.     2. Heart burn  Patient says that he gets benefit with warm water before meals. He doesn't want to talk about this issue more. I recommended that he try over the counter omeprazole at our last appointment and reminded him of this today. His symptoms are still unresolved. Discussed of serious medical conditions that can be missed if he is not following up. But due to insurance cost he does not want to talk about it today.    3. Screening for lipoid disorders  Routine screening     4. Screening for diabetes mellitus  Routine screening     COUNSELING:  Reviewed preventive health counseling, as reflected in patient " "instructions  Special attention given to:        Regular exercise       Healthy diet/nutrition       Safe sex practices/STD prevention    Estimated body mass index is 24.91 kg/m  as calculated from the following:    Height as of this encounter: 1.619 m (5' 3.75\").    Weight as of this encounter: 65.3 kg (144 lb).     reports that he has never smoked. He has never used smokeless tobacco.      Counseling Resources:  ATP IV Guidelines  Pooled Cohorts Equation Calculator  FRAX Risk Assessment  ICSI Preventive Guidelines  Dietary Guidelines for Americans, 2010  USDA's MyPlate  ASA Prophylaxis  Lung CA Screening    The information in this document, created by the medical scribe for me, accurately reflects the services I personally performed and the decisions made by me. I have reviewed and approved this document for accuracy prior to leaving the patient care area.  August 26, 2019 7:41 AM    Yesenia Silva DO  [facility]  "

## 2019-08-26 NOTE — LETTER
[facility]  [address] [place] [id number]  [phone]                                                                                                September 3, 2019    Ric Izaguirre  [address] [place] [address]  [place] [address]        Dear Mr. Iazguirre,    Your recent lab results were within normal limits. A copy of those results are included with this letter.      Sincerely,      Yesenia Silva DO/hl    Results for orders placed or performed in visit on 08/26/19   Lipid panel reflex to direct LDL Fasting   Result Value Ref Range    Cholesterol 123 <200 mg/dL    Triglycerides 70 <150 mg/dL    HDL Cholesterol 42 >39 mg/dL    LDL Cholesterol Calculated 67 <100 mg/dL    Non HDL Cholesterol 81 <130 mg/dL   Basic metabolic panel   Result Value Ref Range    Sodium 139 133 - 144 mmol/L    Potassium 4.4 3.4 - 5.3 mmol/L    Chloride 106 94 - 109 mmol/L    Carbon Dioxide 28 20 - 32 mmol/L    Anion Gap 5 3 - 14 mmol/L    Glucose 100 (H) 70 - 99 mg/dL    Urea Nitrogen 11 7 - 30 mg/dL    Creatinine 0.71 0.66 - 1.25 mg/dL    GFR Estimate >90 >60 mL/min/[1.73_m2]    GFR Estimate If Black >90 >60 mL/min/[1.73_m2]    Calcium 8.8 8.5 - 10.1 mg/dL   HIV Antigen Antibody Combo   Result Value Ref Range    HIV Antigen Antibody Combo Nonreactive NR^Nonreactive       Treponema Abs w Reflex to RPR and Titer   Result Value Ref Range    Treponema Antibodies Nonreactive NR^Nonreactive

## 2019-08-26 NOTE — PATIENT INSTRUCTIONS
Try Omeprazole (Prilosec) on an empty stomach. You can purchase this over the counter for a month and see if your heartburn symptoms resolve. If so you should continue this medication.     Preventive Health Recommendations  Male Ages 26 - 39    Yearly exam:             See your health care provider every year in order to  o   Review health changes.   o   Discuss preventive care.    o   Review your medicines if your doctor has prescribed any.    You should be tested each year for STDs (sexually transmitted diseases), if you re at risk.     After age 35, talk to your provider about cholesterol testing. If you are at risk for heart disease, have your cholesterol tested at least every 5 years.     If you are at risk for diabetes, you should have a diabetes test (fasting glucose).  Shots: Get a flu shot each year. Get a tetanus shot every 10 years.     Nutrition:    Eat at least 5 servings of fruits and vegetables daily.     Eat whole-grain bread, whole-wheat pasta and brown rice instead of white grains and rice.     Get adequate Calcium and Vitamin D.     Lifestyle    Exercise for at least 150 minutes a week (30 minutes a day, 5 days a week). This will help you control your weight and prevent disease.     Limit alcohol to one drink per day.     No smoking.     Wear sunscreen to prevent skin cancer.     See your dentist every six months for an exam and cleaning.

## 2019-08-27 LAB — T PALLIDUM AB SER QL: NONREACTIVE

## 2020-03-06 ENCOUNTER — TELEPHONE (OUTPATIENT)
Dept: FAMILY MEDICINE | Facility: CLINIC | Age: 35
End: 2020-03-06

## 2020-03-06 NOTE — TELEPHONE ENCOUNTER
Reason for Call:  Other     Detailed comments: the patient needs a health care summary form and lab results for the last physical that he had in August 2019 please call him when ready to be picked up at the      Phone Number Patient can be reached at: Home number on file [phone] (home)    Best Time: any    Can we leave a detailed message on this number? YES    Call taken on 3/6/2020 at 7:57 AM by Marianne John

## 2020-03-06 NOTE — TELEPHONE ENCOUNTER
Patient contacted. AVS and lab results placed at  for  along with MARY to be completed.    Thank you,  Madai VARGAS  [facility]  Team Emily Coordinator

## 2020-03-10 ENCOUNTER — HEALTH MAINTENANCE LETTER (OUTPATIENT)
Age: 35
End: 2020-03-10

## 2020-07-31 ENCOUNTER — COMMUNICATION - HEALTHEAST (OUTPATIENT)
Dept: SCHEDULING | Facility: CLINIC | Age: 35
End: 2020-07-31

## 2020-10-20 ENCOUNTER — OFFICE VISIT (OUTPATIENT)
Dept: FAMILY MEDICINE | Facility: CLINIC | Age: 35
End: 2020-10-20
Payer: COMMERCIAL

## 2020-10-20 VITALS
OXYGEN SATURATION: 100 % | HEIGHT: 64 IN | DIASTOLIC BLOOD PRESSURE: 70 MMHG | BODY MASS INDEX: 26.46 KG/M2 | SYSTOLIC BLOOD PRESSURE: 112 MMHG | HEART RATE: 70 BPM | WEIGHT: 155 LBS

## 2020-10-20 DIAGNOSIS — Z11.3 SCREEN FOR STD (SEXUALLY TRANSMITTED DISEASE): ICD-10-CM

## 2020-10-20 DIAGNOSIS — Z13.220 SCREENING FOR LIPOID DISORDERS: ICD-10-CM

## 2020-10-20 DIAGNOSIS — Z00.00 ROUTINE GENERAL MEDICAL EXAMINATION AT A HEALTH CARE FACILITY: Primary | ICD-10-CM

## 2020-10-20 DIAGNOSIS — R73.09 ELEVATED GLUCOSE: ICD-10-CM

## 2020-10-20 LAB
HBA1C MFR BLD: 5.7 % (ref 0–5.6)
HGB BLD-MCNC: 14.1 G/DL (ref 13.3–17.7)
HIV 1+2 AB+HIV1 P24 AG SERPL QL IA: NONREACTIVE

## 2020-10-20 PROCEDURE — 87389 HIV-1 AG W/HIV-1&-2 AB AG IA: CPT | Performed by: FAMILY MEDICINE

## 2020-10-20 PROCEDURE — 85018 HEMOGLOBIN: CPT | Performed by: FAMILY MEDICINE

## 2020-10-20 PROCEDURE — 36415 COLL VENOUS BLD VENIPUNCTURE: CPT | Performed by: FAMILY MEDICINE

## 2020-10-20 PROCEDURE — 99395 PREV VISIT EST AGE 18-39: CPT | Performed by: FAMILY MEDICINE

## 2020-10-20 PROCEDURE — 87491 CHLMYD TRACH DNA AMP PROBE: CPT | Performed by: FAMILY MEDICINE

## 2020-10-20 PROCEDURE — 86780 TREPONEMA PALLIDUM: CPT | Mod: 90 | Performed by: FAMILY MEDICINE

## 2020-10-20 PROCEDURE — 80061 LIPID PANEL: CPT | Performed by: FAMILY MEDICINE

## 2020-10-20 PROCEDURE — 99000 SPECIMEN HANDLING OFFICE-LAB: CPT | Performed by: FAMILY MEDICINE

## 2020-10-20 PROCEDURE — 83036 HEMOGLOBIN GLYCOSYLATED A1C: CPT | Performed by: FAMILY MEDICINE

## 2020-10-20 PROCEDURE — 87591 N.GONORRHOEAE DNA AMP PROB: CPT | Performed by: FAMILY MEDICINE

## 2020-10-20 RX ORDER — CHOLECALCIFEROL (VITAMIN D3) 50 MCG
1 TABLET ORAL 2 TIMES DAILY
Qty: 180 TABLET | Refills: 3 | Status: SHIPPED | OUTPATIENT
Start: 2020-10-20 | End: 2024-03-28

## 2020-10-20 ASSESSMENT — ENCOUNTER SYMPTOMS
DIZZINESS: 0
EYE PAIN: 0
COUGH: 0
FEVER: 0
FREQUENCY: 0
CHILLS: 0
CONSTIPATION: 0
ABDOMINAL PAIN: 0
DIARRHEA: 0
HEMATURIA: 0
HEMATOCHEZIA: 0
NERVOUS/ANXIOUS: 0

## 2020-10-20 ASSESSMENT — MIFFLIN-ST. JEOR: SCORE: 1535.11

## 2020-10-20 NOTE — PATIENT INSTRUCTIONS
Vit D    Patient Education         Preventive Health Recommendations  Male Ages 26 - 39    Yearly exam:             See your health care provider every year in order to  o   Review health changes.   o   Discuss preventive care.    o   Review your medicines if your doctor has prescribed any.    You should be tested each year for STDs (sexually transmitted diseases), if you re at risk.     After age 35, talk to your provider about cholesterol testing. If you are at risk for heart disease, have your cholesterol tested at least every 5 years.     If you are at risk for diabetes, you should have a diabetes test (fasting glucose).  Shots: Get a flu shot each year. Get a tetanus shot every 10 years.     Nutrition:    Eat at least 5 servings of fruits and vegetables daily.     Eat whole-grain bread, whole-wheat pasta and brown rice instead of white grains and rice.     Get adequate Calcium and Vitamin D.     Lifestyle    Exercise for at least 150 minutes a week (30 minutes a day, 5 days a week). This will help you control your weight and prevent disease.     Limit alcohol to one drink per day.     No smoking.     Wear sunscreen to prevent skin cancer.     See your dentist every six months for an exam and cleaning.

## 2020-10-20 NOTE — PROGRESS NOTES
SUBJECTIVE:   CC: Ric Izaguirre is an 35 year old male who presents for preventative health visit.       Patient has been advised of split billing requirements and indicates understanding: Yes  Healthy Habits:     Getting at least 3 servings of Calcium per day:  Yes    Bi-annual eye exam:  NO    Dental care twice a year:  Yes    Sleep apnea or symptoms of sleep apnea:  None    Diet:  Regular (no restrictions)    Frequency of exercise:  None    Taking medications regularly:  Not Applicable    Medication side effects:  Not applicable    PHQ-2 Total Score: 0    Additional concerns today:  No        Today's PHQ-2 Score:   PHQ-2 ( 1999 Pfizer) 10/20/2020   Q1: Little interest or pleasure in doing things 0   Q2: Feeling down, depressed or hopeless 0   PHQ-2 Score 0   Q1: Little interest or pleasure in doing things Not at all   Q2: Feeling down, depressed or hopeless Not at all   PHQ-2 Score 0       Abuse: Current or Past(Physical, Sexual or Emotional)- No  Do you feel safe in your environment? Yes        Social History     Tobacco Use     Smoking status: Never Smoker     Smokeless tobacco: Never Used   Substance Use Topics     Alcohol use: No         Alcohol Use 10/20/2020   Prescreen: >3 drinks/day or >7 drinks/week? Not Applicable   Prescreen: >3 drinks/day or >7 drinks/week? -       Last PSA: No results found for: PSA    Reviewed orders with patient. Reviewed health maintenance and updated orders accordingly - Yes  BP Readings from Last 3 Encounters:   10/20/20 112/70   08/26/19 110/70   08/29/18 122/66    Wt Readings from Last 3 Encounters:   10/20/20 70.3 kg (155 lb)   08/26/19 65.3 kg (144 lb)   08/28/18 66 kg (145 lb 8 oz)                    Reviewed and updated as needed this visit by clinical staff  Tobacco  Allergies    Med Hx  Surg Hx  Fam Hx  Soc Hx        Reviewed and updated as needed this visit by Provider                Past Medical History:   Diagnosis Date     Abscess     left lower lip      "NO ACTIVE PROBLEMS       Past Surgical History:   Procedure Laterality Date     NO HISTORY OF SURGERY       OB History   No obstetric history on file.       Review of Systems   Constitutional: Negative for chills and fever.   HENT: Negative for congestion and ear pain.    Eyes: Negative for pain.   Respiratory: Negative for cough.    Cardiovascular: Negative for chest pain.   Gastrointestinal: Negative for abdominal pain, constipation, diarrhea and hematochezia.   Genitourinary: Negative for frequency, genital sores and hematuria.   Neurological: Negative for dizziness.   Psychiatric/Behavioral: The patient is not nervous/anxious.      CONSTITUTIONAL: NEGATIVE for fever, chills, change in weight  INTEGUMENTARY/SKIN: NEGATIVE for worrisome rashes, moles or lesions  EYES: NEGATIVE for vision changes or irritation  ENT: NEGATIVE for ear, mouth and throat problems  RESP: NEGATIVE for significant cough or SOB  CV: NEGATIVE for chest pain, palpitations or peripheral edema  GI: NEGATIVE for nausea, abdominal pain, heartburn, or change in bowel habits   male: negative for dysuria, hematuria, decreased urinary stream, erectile dysfunction, urethral discharge  MUSCULOSKELETAL: NEGATIVE for significant arthralgias or myalgia  NEURO: NEGATIVE for weakness, dizziness or paresthesias  PSYCHIATRIC: NEGATIVE for changes in mood or affect    OBJECTIVE:   /70   Pulse 70   Ht 1.619 m (5' 3.75\")   Wt 70.3 kg (155 lb)   SpO2 100%   BMI 26.81 kg/m      Physical Exam  GENERAL: healthy, alert and no distress  EYES: Eyes grossly normal to inspection, PERRL and conjunctivae and sclerae normal  HENT: ear canals and TM's normal, nose and mouth without ulcers or lesions  NECK: no adenopathy, no asymmetry, masses, or scars and thyroid normal to palpation  RESP: lungs clear to auscultation - no rales, rhonchi or wheezes  CV: regular rate and rhythm, normal S1 S2, no S3 or S4, no murmur, click or rub, no peripheral edema and " "peripheral pulses strong  ABDOMEN: soft, nontender, no hepatosplenomegaly, no masses and bowel sounds normal  MS: no gross musculoskeletal defects noted, no edema  SKIN: no suspicious lesions or rashes  NEURO: Normal strength and tone, mentation intact and speech normal  PSYCH: mentation appears normal, affect normal/bright    Diagnostic Test Results:  Labs reviewed in Epic  Results for orders placed or performed in visit on 10/20/20   Hemoglobin     Status: None   Result Value Ref Range    Hemoglobin 14.1 13.3 - 17.7 g/dL         ASSESSMENT/PLAN:       ICD-10-CM    1. Routine general medical examination at a health care facility  Z00.00 Hemoglobin     vitamin D3 (CHOLECALCIFEROL) 50 mcg (2000 units) tablet   2. Elevated glucose  R73.09 Hemoglobin A1c   3. Screening for lipoid disorders  Z13.220 Lipid panel reflex to direct LDL Fasting   4. Screen for STD (sexually transmitted disease)  Z11.3 HIV Antigen Antibody Combo     Treponema Abs w Reflex to RPR and Titer     Chlamydia trachomatis PCR     Neisseria gonorrhoeae PCR     No complaints  Last year slightly elevated sugar-check [id number]  Screening labs requested    Patient has been advised of split billing requirements and indicates understanding: Yes  COUNSELING:   Reviewed preventive health counseling, as reflected in patient instructions    Estimated body mass index is 26.81 kg/m  as calculated from the following:    Height as of this encounter: 1.619 m (5' 3.75\").    Weight as of this encounter: 70.3 kg (155 lb).         He reports that he has never smoked. He has never used smokeless tobacco.      Counseling Resources:  ATP IV Guidelines  Pooled Cohorts Equation Calculator  FRAX Risk Assessment  ICSI Preventive Guidelines  Dietary Guidelines for Americans, 2010  USDA's MyPlate  ASA Prophylaxis  Lung CA Screening    DO BETO Irwin [facility]  " I have personally evaluated and examined the patient. The Attending was available to me as a supervising provider if needed.

## 2020-10-21 LAB
C TRACH DNA SPEC QL NAA+PROBE: NEGATIVE
CHOLEST SERPL-MCNC: 135 MG/DL
HDLC SERPL-MCNC: 41 MG/DL
LDLC SERPL CALC-MCNC: 81 MG/DL
N GONORRHOEA DNA SPEC QL NAA+PROBE: NEGATIVE
NONHDLC SERPL-MCNC: 94 MG/DL
SPECIMEN SOURCE: NORMAL
SPECIMEN SOURCE: NORMAL
T PALLIDUM AB SER QL: NONREACTIVE
TRIGL SERPL-MCNC: 63 MG/DL

## 2020-10-29 NOTE — RESULT ENCOUNTER NOTE
Your diabetes test show that you are prediabetic, please avoid sugary foods and exercise, recheck here with us in 3 months to recheck, otherwise normal labs.  Please contact me if you have any questions.  Take care,  Yesenia Silva D.O.

## 2020-12-22 ENCOUNTER — OFFICE VISIT (OUTPATIENT)
Dept: FAMILY MEDICINE | Facility: CLINIC | Age: 35
End: 2020-12-22
Payer: OTHER MISCELLANEOUS

## 2020-12-22 VITALS
BODY MASS INDEX: 25.27 KG/M2 | HEIGHT: 64 IN | DIASTOLIC BLOOD PRESSURE: 68 MMHG | SYSTOLIC BLOOD PRESSURE: 114 MMHG | WEIGHT: 148 LBS | OXYGEN SATURATION: 99 % | HEART RATE: 72 BPM

## 2020-12-22 DIAGNOSIS — M25.511 ACUTE PAIN OF RIGHT SHOULDER: ICD-10-CM

## 2020-12-22 DIAGNOSIS — Z02.6 ENCOUNTER RELATED TO WORKER'S COMPENSATION CLAIM: Primary | ICD-10-CM

## 2020-12-22 PROCEDURE — 99213 OFFICE O/P EST LOW 20 MIN: CPT | Performed by: FAMILY MEDICINE

## 2020-12-22 RX ORDER — IBUPROFEN 800 MG/1
800 TABLET, FILM COATED ORAL EVERY 8 HOURS PRN
Qty: 180 TABLET | Refills: 1 | Status: SHIPPED | OUTPATIENT
Start: 2020-12-22 | End: 2021-01-07 | Stop reason: ALTCHOICE

## 2020-12-22 RX ORDER — SENNOSIDES 8.6 MG
650 CAPSULE ORAL EVERY 8 HOURS PRN
Qty: 120 TABLET | Refills: 1 | Status: SHIPPED | OUTPATIENT
Start: 2020-12-22 | End: 2021-03-22

## 2020-12-22 ASSESSMENT — MIFFLIN-ST. JEOR: SCORE: 1503.35

## 2020-12-22 NOTE — PROGRESS NOTES
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 work   Recent illness:  no  Previous similar problem: no  Previous evaluation:  no  Precipitating or alleviating factors:  Aggravating factors include: when he is driving he notices the   Therapies tried and outcome: nothing  A client fell and he went to hold him(he was bigger man) he felt like his body was pulling on his arm and felt like he pulled his shoulder (right)  Right handed, no swelling,Weaknes, no numbness  Shoulder pain anterior, hurts with turning wheel, overhead, lateral movements  He has been working since Sunday so not rested    No fall, no trauma history        Review of Systems   Constitutional, HEENT, cardiovascular, pulmonary, GI, , musculoskeletal, neuro, skin, endocrine and psych systems are negative, except as otherwise noted.      Objective    /68   Pulse 72   Ht 1.619 m (5' 3.75\")   Wt 67.1 kg (148 lb)   SpO2 99%   BMI 25.60 kg/m    Body mass index is 25.6 kg/m .  Physical Exam   GENERAL: healthy, alert and no distress  RESP: lungs clear to auscultation - no rales, rhonchi or wheezes  CV: regular rate and rhythm, normal S1 S2, no S3 or S4, no murmur, click or rub, no peripheral edema and peripheral pulses strong  ABDOMEN: soft, nontender, no hepatosplenomegaly, no masses and bowel sounds normal  MS: decreased EOM of right shoulder , limited with extension, pain with palpatin over head of biceps, exam was stopped due to patient intolerance  no gross " musculoskeletal defects noted, no edema  SKIN: no suspicious lesions or rashes  NEURO: Normal strength and tone, mentation intact and speech normal  PSYCH: mentation appears normal, affect normal/bright    No results found for this or any previous visit (from the past 24 hour(s)).        Assessment & Plan       ICD-10-CM    1. Encounter related to worker's compensation claim  Z02.6    2. Acute pain of right shoulder  M25.511 [facility]     acetaminophen (TYLENOL) 650 MG CR tablet     ibuprofen (ADVIL/MOTRIN) 800 MG tablet         Acute shoulder pain, most likely sprain/strain, advised ice, heat, rest, work restriction given  Follow up with physical therapy   Here in 1-2 weeks    See Patient Instructions    No follow-ups on file.    DO BETO Irwin [facility]

## 2020-12-22 NOTE — LETTER
REPORT OF WORK COMP    [address] [address]  [phone]      PATIENT DATA    Employee Name: Ric Izaguirre      : 1985     SS#: (Not on file)    Work related injury: Yes      Today's date: 2020  Date of injury: 20  Date of first visit: 2020      PROVIDER EVALUATION: Please fill in as needed.  Please give copy to employee for employer.    1. Diagnosis: right shoulder pain, strain/sprain    2. Treatment: physical therapy, ice , heat.  3. Medication: tylenol, ibuprofen    NOTE: When ordering a medication, [place] Rules require Work Comp or WC on prescriptions.    4 Return to work date: 20  ** WITH RESTRICTIONS? Yes, with work restrictions: * Restricted lifting - Maximum lift in pounds: 10  * Limited repetitive motion.  DURATION OF LIMITATIONS: on the right extremity, no overhead lifting for now       RESTRICTIONS: Unlimited unless listed.  Restrictions apply to home and leisure also.  If work restrictions is not available, the employee is totally disabled.        Provider comments: work with physical therapy and ice/heat/ rest/ice, work restriction    Medical Examiner: Yesenia Silva D.O.        Next appointment: 2 weeks    CC: Employer, Managed Care Plan/Payor, Patient

## 2021-01-05 ENCOUNTER — THERAPY VISIT (OUTPATIENT)
Dept: PHYSICAL THERAPY | Facility: CLINIC | Age: 36
End: 2021-01-05
Payer: OTHER MISCELLANEOUS

## 2021-01-05 ENCOUNTER — TELEPHONE (OUTPATIENT)
Dept: FAMILY MEDICINE | Facility: CLINIC | Age: 36
End: 2021-01-05

## 2021-01-05 DIAGNOSIS — M25.511 RIGHT ANTERIOR SHOULDER PAIN: ICD-10-CM

## 2021-01-05 PROCEDURE — 97110 THERAPEUTIC EXERCISES: CPT | Mod: GP | Performed by: PHYSICAL THERAPIST

## 2021-01-05 PROCEDURE — 97530 THERAPEUTIC ACTIVITIES: CPT | Mod: GP | Performed by: PHYSICAL THERAPIST

## 2021-01-05 PROCEDURE — 97161 PT EVAL LOW COMPLEX 20 MIN: CPT | Mod: GP | Performed by: PHYSICAL THERAPIST

## 2021-01-05 NOTE — TELEPHONE ENCOUNTER
I told him to make an appoint- follow up in 1-2 weeks  If not improving [facility]    I can work him in maybe Thursday if only wants to see me(in chart review -am)    Yesenia Silva D.O.

## 2021-01-05 NOTE — TELEPHONE ENCOUNTER
RN spoke with patient.  Clinic appointment scheduled for 1:20 pm with Dr. Donald Johnson RN, BSN, PHN  [facility]

## 2021-01-05 NOTE — TELEPHONE ENCOUNTER
Reason for Call:  Other     Detailed comments:  Patient is wondering how long the work restriction of not lifting more than 10 pounds will last for.  Please call patient back.    Phone Number Patient can be reached at: Home number on file [phone] (home)    Best Time: any    Can we leave a detailed message on this number? YES    Call taken on 1/5/2021 at 11:53 AM by Cindy iLao

## 2021-01-05 NOTE — TELEPHONE ENCOUNTER
Routing to provider    Calling regarding length of work restrictions    Visit from 12-22 [facility] to return in 1-2 weeks    Would you like to squeeze this patient in or ok to see any provider    Ed Johnson, RN, BSN, PHN  [facility]

## 2021-01-05 NOTE — PROGRESS NOTES
[facility] Initial Evaluation  Physical Therapy Initial Examination/Evaluation    January 5, 2021    Ric Izaguirre  is a 35 year old  male referred to physical therapy by Dr. Silva for treatment of R shoulder and upper extremity.      DOI/onset 12/18/2020, ED 12/25/2020  Mechanism of injury The resident wanted to get up from the bed to the recliner.  From the process of moving to the recliner to the bed, he lost his balance.  He was going down and I grabbed him and held him until the nurse came into the room.  I slowly lowered him and it hurt.  I worked through the weekend and it progressively became more sore.    DOS none  Prior treatment medication. Effect of prior treatment poor    Chief Complaint:   Pain with lifting- pain returning to neutral.   Pain location: top of the right arm.  Pt is right handed  Quality: sharp  Constant/Intermittent: constant, pulsing  Time of day: unknown  Symptoms have worsened since onset.    Current pain 0/10.  Pain at best 0/10.  Pain at worst 8/10.    Symptoms aggravated by reaching, driving.    Symptoms improved with rest.     Social history:  Pt is , 3 kids.  7, 4 and 3 months.  Pt lives in a town home.  Pt does not have to do plowing or shoveling.  Pt does not do regular exercise outside of work.  Occasionally will do exercise at work -[employer].      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  Job duties:  Lifting, carrying, operating machine, pushing/pulling.    Patient having difficulty with ADLs: lifting, sleeping.    Patient's goals are improve pain.    Patient reports general health as excellent.  Related medical history none reported.    Surgical History:  CNA.    Imaging: none.    Medications:  none.       Outcome measure:   Pt did not have time today- was talking to his friend on the floor  Return to MD:  As needed.      Clinical Impression: Ric presents to [facility] with primary complaint of right shoulder pain following WC injury.  Per  clinical examination suspect RTC dysfunction as pt with pain during a modified impingement test.  Further special testing held as pt with high levels of pain today.  Pt will benefit from skilled physical therapy intervention to improve pain and overall function.  See plan of care outlined below.       HPI                    Objective:  Observation:   Sitting: Rounded shoulders and forward head.  Significant guarding with don/doff coat today    System                   Shoulder Evaluation:  ROM:  AROM:    Flexion:  Left:  145    Right:  80    Abduction:  Left: 170   Right:  108            Elbow Flexion:  Left:  WNL    Right:  WNL  Elbow Extension:  Left:  WNL   Right:  WNL    Extension/Internal Rotation:  Left:  T6    Right:  Refused    PROM:    Flexion:  Right: 150 end range pain             External Rotation:  Right:  85 deg guarded and painful                     Strength:    Flexion: Left:5/5   Pain:        Abduction:  Left: 5/5  Pain:        Internal Rotation:  Left:5/5     Pain:    Right: 5/5     Pain:  External Rotation:   Left:5/5     Pain:   Right:3-/5    Weak/painful    Pain:+++        Elbow Flexion:  Left:5/5     Pain:    Right:4+/5   Weak/pain free    Pain:  Elbow Extension:  Left:5/5     Pain:    Right:5/5     Pain:  Stability Testing:  not assessed      Special Tests:  Special tests assessed shoulder: deferred due pain today.  Pain end range flexion- deferred special testing                                            General     ROS    Assessment/Plan:    Patient is a 35 year old male with right side shoulder complaints.    Patient has the following significant findings with corresponding treatment plan.                Diagnosis 1:  R shoulder pain    Pain -  hot/cold therapy, US, manual therapy, self management, education and home program  Decreased ROM/flexibility - manual therapy and therapeutic exercise  Decreased joint mobility - manual therapy and therapeutic exercise  Decreased strength -  therapeutic exercise and therapeutic activities  Inflammation - cold therapy, US and self management/home program  Impaired muscle performance - neuro re-education  Decreased function - therapeutic activities  Impaired posture - neuro re-education    Therapy Evaluation Codes:   1) History comprised of:   Personal factors that impact the plan of care:      Profession and Secondary gain.    Comorbidity factors that impact the plan of care are:      None.     Medications impacting care: Pain.  2) Examination of Body Systems comprised of:   Body structures and functions that impact the plan of care:      Shoulder.   Activity limitations that impact the plan of care are:      Bathing, Cooking, Driving, Dressing, Grasping, Lifting, Working, Sleeping and Laying down.  3) Clinical presentation characteristics are:   Stable/Uncomplicated.  4) Decision-Making    Low complexity using standardized patient assessment instrument and/or measureable assessment of functional outcome.  Cumulative Therapy Evaluation is: Low complexity.    Previous and current functional limitations:  (See Goal Flow Sheet for this information)    Short term and Long term goals: (See Goal Flow Sheet for this information)     Communication ability:  Patient appears to be able to clearly communicate and understand verbal and written communication and follow directions correctly.  Treatment Explanation - The following has been discussed with the patient:   RX ordered/plan of care  Anticipated outcomes  Possible risks and side effects  This patient would benefit from PT intervention to resume normal activities.   Rehab potential is excellent.    Frequency:  1-2 X week, once daily  Duration:  for 1 months tapering to 2 X a month over 2 months  Discharge Plan:  Achieve all LTG.  Independent in home treatment program.  Reach maximal therapeutic benefit.    Please refer to the daily flowsheet for treatment today, total treatment time and time spent performing  1:1 timed codes.

## 2021-01-07 ENCOUNTER — OFFICE VISIT (OUTPATIENT)
Dept: FAMILY MEDICINE | Facility: CLINIC | Age: 36
End: 2021-01-07
Payer: OTHER MISCELLANEOUS

## 2021-01-07 VITALS
SYSTOLIC BLOOD PRESSURE: 128 MMHG | OXYGEN SATURATION: 100 % | HEART RATE: 83 BPM | TEMPERATURE: 98.5 F | DIASTOLIC BLOOD PRESSURE: 74 MMHG | BODY MASS INDEX: 25.61 KG/M2 | WEIGHT: 150 LBS | HEIGHT: 64 IN

## 2021-01-07 DIAGNOSIS — Z02.6 ENCOUNTER RELATED TO WORKER'S COMPENSATION CLAIM: Primary | ICD-10-CM

## 2021-01-07 DIAGNOSIS — S46.911A STRAIN OF RIGHT SHOULDER, INITIAL ENCOUNTER: ICD-10-CM

## 2021-01-07 PROCEDURE — 99213 OFFICE O/P EST LOW 20 MIN: CPT | Performed by: FAMILY MEDICINE

## 2021-01-07 RX ORDER — PREDNISONE 20 MG/1
TABLET ORAL
Qty: 10 TABLET | Refills: 0 | Status: SHIPPED | OUTPATIENT
Start: 2021-01-07 | End: 2021-02-02

## 2021-01-07 ASSESSMENT — MIFFLIN-ST. JEOR: SCORE: 1512.91

## 2021-01-07 ASSESSMENT — PAIN SCALES - GENERAL: PAINLEVEL: EXTREME PAIN (8)

## 2021-01-07 NOTE — PROGRESS NOTES
Assessment & Plan     Encounter related to worker's compensation claim  Still having pain, no improvement.  Started physical therapy.  Continue with physical therapy, continue with the same restriction.    Follow-up in 4 weeks with primary care physician.    - predniSONE (DELTASONE) 20 MG tablet; 2 tab daily for 5 days  - nabumetone (RELAFEN) 750 MG tablet; One tab bid as need with food ( Ibuprofen been discontinued )    Strain of right shoulder, initial encounter    - predniSONE (DELTASONE) 20 MG tablet; 2 tab daily for 5 days  - nabumetone (RELAFEN) 750 MG tablet; One tab bid as need with food ( Ibuprofen been discontinued )             There are no Patient Instructions on file for this visit.    Return in about 4 weeks (around 2/4/2021) for Follow up.    Leela Bennett MD  [facility]    Rodney Larios is a 35 year old who presents to clinic today for the following health issues   Work comp injury, injured his right shoulder over 3 weeks ago.  Continued to have stiffness, pain, decreased range of motion.  He has started physical therapy here to session.    Been taking ibuprofen is not helping.    No numbness tingling in the fingers.    Review of Systems   Constitutional, HEENT, cardiovascular, pulmonary, gi and gu systems are negative, except as otherwise noted.      Objective    There were no vitals taken for this visit.  There is no height or weight on file to calculate BMI.  Physical Exam   GENERAL APPEARANCE: healthy, alert and no distress  ORTHO:   SHOULDER Exam-Right   Inspection: no swelling, no bruising, no discoloration, no obvious deformity, no asymmetry, no glenohumeral joint anterior bulge, no distal clavicle elevation, no muscle atrophy, no scapular winging   Tenderness of: tenderness at anterior shoulder and bicep tendons.   Range of Motion: Active- limited due to pain.  Range of Motion: Passive- limited due to pain.   Strength: decreased with  active  Normal passive   Special tests: Neers- negative        NEURO: Normal strength and tone and mentation intact  PSYCH: mentation appears normal and affect normal/bright    Leela Bennett MD

## 2021-01-07 NOTE — LETTER
January 7, 2021      Ric Izaguirre  [address] [facility] [address]        To Whom It May Concern:    Ric Izaguirre  was seen on 01/07/2021.    Take today off, no work on 01/07/2021  Return to work on Monday, 01/11/2021 with the following restriction for the next 4 weeks  WITH RESTRICTIONS? Yes, with work restrictions: * Restricted lifting - Maximum lift in pounds: 10  Limited repetitive motion.    Use right arm/ shoulder as tolerated.   Continue with PT  Follow up with PCP in 4 weeks    Sincerely,        Leela Bennett MD

## 2021-01-13 ENCOUNTER — THERAPY VISIT (OUTPATIENT)
Dept: PHYSICAL THERAPY | Facility: CLINIC | Age: 36
End: 2021-01-13
Payer: OTHER MISCELLANEOUS

## 2021-01-13 DIAGNOSIS — M25.511 RIGHT ANTERIOR SHOULDER PAIN: ICD-10-CM

## 2021-01-13 PROCEDURE — 97112 NEUROMUSCULAR REEDUCATION: CPT | Mod: GP | Performed by: PHYSICAL THERAPIST

## 2021-01-13 PROCEDURE — 97110 THERAPEUTIC EXERCISES: CPT | Mod: GP | Performed by: PHYSICAL THERAPIST

## 2021-01-13 PROCEDURE — 97140 MANUAL THERAPY 1/> REGIONS: CPT | Mod: GP | Performed by: PHYSICAL THERAPIST

## 2021-01-19 ENCOUNTER — THERAPY VISIT (OUTPATIENT)
Dept: PHYSICAL THERAPY | Facility: CLINIC | Age: 36
End: 2021-01-19
Payer: OTHER MISCELLANEOUS

## 2021-01-19 DIAGNOSIS — M25.511 RIGHT ANTERIOR SHOULDER PAIN: ICD-10-CM

## 2021-01-19 PROCEDURE — 97140 MANUAL THERAPY 1/> REGIONS: CPT | Mod: GP | Performed by: PHYSICAL THERAPIST

## 2021-01-19 PROCEDURE — 97110 THERAPEUTIC EXERCISES: CPT | Mod: GP | Performed by: PHYSICAL THERAPIST

## 2021-01-27 ENCOUNTER — THERAPY VISIT (OUTPATIENT)
Dept: PHYSICAL THERAPY | Facility: CLINIC | Age: 36
End: 2021-01-27
Payer: OTHER MISCELLANEOUS

## 2021-01-27 DIAGNOSIS — M25.511 RIGHT ANTERIOR SHOULDER PAIN: ICD-10-CM

## 2021-01-27 PROCEDURE — 97112 NEUROMUSCULAR REEDUCATION: CPT | Mod: GP | Performed by: PHYSICAL THERAPIST

## 2021-01-27 PROCEDURE — 97110 THERAPEUTIC EXERCISES: CPT | Mod: GP | Performed by: PHYSICAL THERAPIST

## 2021-01-27 PROCEDURE — 97140 MANUAL THERAPY 1/> REGIONS: CPT | Mod: GP | Performed by: PHYSICAL THERAPIST

## 2021-02-01 NOTE — PROGRESS NOTES
Assessment & Plan       ICD-10-CM    1. Right anterior shoulder pain  M25.511 Orthopedic & Spine  Referral   2. Encounter related to worker's compensation claim  Z02.6 Orthopedic & Spine  Referral   3. Cold injury, initial encounter  T69.9XXA        Work comp/shoulder injury-doing better with physical therapy, continue physical therapy for now, work restriction increased weights  Skin color changes due to using ice pack for too long, blisters resolved now, advised close follow up.  56}     No follow-ups on file.    Yesenia Munsonivonbrian Ricardo [facility] Clinic[facility]ENNIFER Larios is a 35 year old who presents to clinic today for     HPI       Concern - work comp  Onset: ongoing  Description: right shoulder injury at work  Last saw Dr. Bennett 1/7/21 and was told to follow up in 4 weeks for recheck.   Intensity: moderate  Progression of Symptoms:  improving    Patient had an injury at work 12/20/20 and has been seeing physical therapy 12/30/20  Followed up and still having pain -was on restriction  Was given prednisone and relafen last visit with my partner 1/7/21    improving still, when he sleeps on shoulder having pain, he was told to put a towel under the arm  Anterior shoulder is the pain, physical therapy one time week, no weakness, no numbness  His pain level is much better 5/10, during the day is ok , at night  No neck pain, no headache  He is on restriction for 10 lbs          Review of Systems   Constitutional, HEENT, cardiovascular, pulmonary, GI, , musculoskeletal, neuro, skin, endocrine and psych systems are negative, except as otherwise noted.      Objective    There were no vitals taken for this visit.  There is no height or weight on file to calculate BMI.  Physical Exam   GENERAL: healthy, alert and no distress  EYES: Eyes grossly normal to inspection, PERRL and conjunctivae and sclerae normal  HENT: ear canals and TM's normal, nose and mouth without  ulcers or lesions  NECK: no adenopathy, no asymmetry, masses, or scars and thyroid normal to palpation  RESP: lungs clear to auscultation - no rales, rhonchi or wheezes  CV: regular rate and rhythm, normal S1 S2, no S3 or S4, no murmur, click or rub, no peripheral edema and peripheral pulses strong  ABDOMEN: soft, nontender, no hepatosplenomegaly, no masses and bowel sounds normal  MS: no gross musculoskeletal defects noted, no edema  SKIN: no suspicious lesions or rashes  NEURO: Normal strength and tone, mentation intact and speech normal  PSYCH: mentation appears normal, affect normal/bright

## 2021-02-02 ENCOUNTER — OFFICE VISIT (OUTPATIENT)
Dept: FAMILY MEDICINE | Facility: CLINIC | Age: 36
End: 2021-02-02
Payer: OTHER MISCELLANEOUS

## 2021-02-02 ENCOUNTER — THERAPY VISIT (OUTPATIENT)
Dept: PHYSICAL THERAPY | Facility: CLINIC | Age: 36
End: 2021-02-02
Payer: OTHER MISCELLANEOUS

## 2021-02-02 VITALS
WEIGHT: 150 LBS | BODY MASS INDEX: 25.61 KG/M2 | HEART RATE: 68 BPM | SYSTOLIC BLOOD PRESSURE: 122 MMHG | OXYGEN SATURATION: 100 % | HEIGHT: 64 IN | DIASTOLIC BLOOD PRESSURE: 70 MMHG

## 2021-02-02 DIAGNOSIS — Z02.6 ENCOUNTER RELATED TO WORKER'S COMPENSATION CLAIM: ICD-10-CM

## 2021-02-02 DIAGNOSIS — M25.511 RIGHT ANTERIOR SHOULDER PAIN: ICD-10-CM

## 2021-02-02 DIAGNOSIS — T69.9XXA COLD INJURY, INITIAL ENCOUNTER: ICD-10-CM

## 2021-02-02 DIAGNOSIS — M25.511 RIGHT ANTERIOR SHOULDER PAIN: Primary | ICD-10-CM

## 2021-02-02 PROCEDURE — 97140 MANUAL THERAPY 1/> REGIONS: CPT | Mod: GP | Performed by: PHYSICAL THERAPIST

## 2021-02-02 PROCEDURE — 99213 OFFICE O/P EST LOW 20 MIN: CPT | Performed by: FAMILY MEDICINE

## 2021-02-02 PROCEDURE — 97112 NEUROMUSCULAR REEDUCATION: CPT | Mod: GP | Performed by: PHYSICAL THERAPIST

## 2021-02-02 PROCEDURE — 97110 THERAPEUTIC EXERCISES: CPT | Mod: GP | Performed by: PHYSICAL THERAPIST

## 2021-02-02 ASSESSMENT — MIFFLIN-ST. JEOR: SCORE: 1512.91

## 2021-02-02 NOTE — LETTER
February 2, 2021      Ric Izaguirre  [address]  [place] [address]          To Whom It May Concern:    Ric Izaguirre  was seen on February 2, 2021       Return to work on Monday,2/2/2021 with the following restriction for the next 4 weeks  WITH RESTRICTIONS? Yes, with work restrictions: * Restricted lifting - Maximum lift in pounds: 15 lbs for right extermity  Limited repetitive motion.  limited range of motion above shoulder.   Use right arm/ shoulder as tolerated.   Continue with PT  Follow up with PCP in 4 weeks      Sincerely,            Yesenia Silva DO

## 2021-02-02 NOTE — NURSING NOTE
Faxed Visit notes and restriction letter to [phone]. This number was provided by patient.     Flori Coon MA

## 2021-02-05 NOTE — ED PROVIDER NOTES
"  History     Chief Complaint   Patient presents with     Chest Pain     Sharp right chest pain when taking deep breaths, started around five.     JENNY Izaguirre is a 33 year old male with no significant past medical history who presents to the ED with chest pain.    Patient reports that at 1700 today he developed nonradiating right sided chest pain that presents when he takes a deep breath, and resolves after the breath. He describes the pain as a heaviness. No associated shortness of breath, nausea or vomiting. Has never has symptoms like this before.     No cardiac history, no DM, HLD or hypertension, nonsmoker, no family cardiac history.     Of note, patient has a lower lip abscess for which he started bactrim today.    I have reviewed the Medications, Allergies, Past Medical and Surgical History, and Social History in the Epic system.    Review of Systems   Constitutional: Negative for chills, diaphoresis and fever.   HENT: Positive for mouth sores (lower lip swelling and pain).    Respiratory: Negative for cough and shortness of breath.    Cardiovascular: Positive for chest pain (with deep breath). Negative for palpitations and leg swelling.   Gastrointestinal: Negative for abdominal distention, abdominal pain, diarrhea and nausea.   Musculoskeletal: Negative for arthralgias and myalgias.   Skin: Negative for rash.   Neurological: Negative for dizziness, weakness, light-headedness, numbness and headaches.   All other systems reviewed and are negative.      Physical Exam   BP: 128/79  Pulse: 90  Temp: 97.7  F (36.5  C)  Resp: 16  Weight: 66 kg (145 lb 8 oz)  SpO2: 97 %      Physical Exam   Constitutional: He is oriented to person, place, and time. He appears well-developed and well-nourished. No distress.   HENT:   Head: Normocephalic and atraumatic.   Left side of lower lip is swollen and painful with small \"head\" on area of fullness. No area of fluctuance.    Eyes: EOM are normal. Pupils are equal, " Consult Note    Reason for Consultation: Rectal bleeding  Date of Consultation: 2/5/2021    Assessment & Plan    67-year-old female, admitted with rectal bleeding x2 days, last episode yesterday, although the patient states she saw some blood in the toilet bowl today when she went to urinate.    Plan: I will schedule her for colonoscopy tomorrow, the bleeding sounds to be rectal or hemorrhoidal perhaps.  Her last colonoscopy was over a year ago at [facility] and she was told that she had polyps.      Active Problems:    * No active hospital problems. *        Subjective   Theresa Duque is a 67 year old female who presents with rectal bleeding x2 days, last episode yesterday.  She did see some blood on the wiping this afternoon while she went to urinate.  Her hemoglobin was 9.  Last colonoscopy 1 year ago at [facility] and she was told she had polyps.       History:  Past Medical History:   Diagnosis Date   • COPD (chronic obstructive pulmonary disease) (CMS/[facility])    • Diabetes mellitus (CMS/[facility])      No past surgical history on file.    Allergies:                ALLERGIES:  Patient has no known allergies.    Home Meds:  (Not in a hospital admission)    Immunizations & Other History:    There is no immunization history on file for this patient.    Social History     Socioeconomic History   • Marital status:      Spouse name: Not on file   • Number of children: Not on file   • Years of education: Not on file   • Highest education level: Not on file   Occupational History   • Not on file   Social Needs   • Financial resource strain: Not on file   • Food insecurity     Worry: Not on file     Inability: Not on file   • Transportation needs     Medical: Not on file     Non-medical: Not on file   Tobacco Use   • Smoking status: Not on file   Substance and Sexual Activity   • Alcohol use: Not on file   • Drug use: Not on file   • Sexual activity: Not on file   Lifestyle   • Physical activity     Days per  round, and reactive to light.   Neck: Normal range of motion.   Cardiovascular: Normal rate, regular rhythm and normal heart sounds.    No murmur heard.  Pulmonary/Chest: Effort normal and breath sounds normal. No respiratory distress. He has no wheezes. He has no rales.   Musculoskeletal: Normal range of motion.   Neurological: He is alert and oriented to person, place, and time.   Skin: Skin is warm and dry.   Psychiatric: He has a normal mood and affect.   Nursing note and vitals reviewed.      ED Course     ED Course   Medications - No data to display   XR Chest 2 Views   Preliminary Result   IMPRESSION: No acute abnormality.        XR Chest 2 Views   Preliminary Result   IMPRESSION: No acute abnormality.           Procedures             EKG Interpretation:      Interpreted by Chiquita Oh  Time reviewed: 2305  Symptoms at time of EKG: none   Rhythm: normal sinus   Rate: normal  Axis: normal  Ectopy: none  Conduction: normal  ST Segments/ T Waves: No ST-T wave changes  Q Waves: none  Comparison to prior: No old EKG available    Clinical Impression: normal EKG      Labs Ordered and Resulted from Time of ED Arrival Up to the Time of Departure from the ED   CBC WITH PLATELETS DIFFERENTIAL - Abnormal; Notable for the following:        Result Value    MCH 26.4 (*)     All other components within normal limits   BASIC METABOLIC PANEL - Abnormal; Notable for the following:     Sodium 145 (*)     Chloride 110 (*)     Glucose 117 (*)     Calcium 8.3 (*)     All other components within normal limits   TROPONIN I   D DIMER QUANTITATIVE     XR Chest 2 Views   Preliminary Result   IMPRESSION: No acute abnormality.               Assessments & Plan (with Medical Decision Making)   Ric Izaguirre is a 33 year old male with no significant past medical hisotry who presents to the ED with pleuritic chest pain. Differential includes ACS, PE, costochondritis, panic, medical reaction, other. On physical exam vitals are normal  week: Not on file     Minutes per session: Not on file   • Stress: Not on file   Relationships   • Social connections     Talks on phone: Not on file     Gets together: Not on file     Attends Baptism service: Not on file     Active member of club or organization: Not on file     Attends meetings of clubs or organizations: Not on file     Relationship status: Not on file   • Intimate partner violence     Fear of current or ex partner: Not on file     Emotionally abused: Not on file     Physically abused: Not on file     Forced sexual activity: Not on file   Other Topics Concern   • Not on file   Social History Narrative   • Not on file     No family history on file.  Review of Systems  Pertinent items are noted in history of present illness     Objective   Vitals Ranges and Last Value:  Temp:  [97.3 °F (36.3 °C)] 97.3 °F (36.3 °C)  Heart Rate:  [80] 80  Resp:  [16] 16  BP: (168)/(52) 168/52  Weight change:     Exam:  Alert and oriented to person place and time, no acute distress.  Neck is supple without lymphadenopathy.  Chest clear to auscultation.  Heart revealed normal S1 and S2.  Abdomen is soft, nontender, and nondistended.  No guarding or rebound.  Extremities reveal no edema.     Intake/Output last 3 shifts:  No intake/output data recorded.  Intake/Output this shift:  No intake/output data recorded.     Medications:  Scheduled    Infusions  • lactated ringers infusion       PRN       Results:  Admission on 02/05/2021   Component Date Value   • Sodium 02/05/2021 140    • Potassium 02/05/2021 4.7    • Chloride 02/05/2021 110*   • Carbon Dioxide 02/05/2021 24    • Anion Gap 02/05/2021 11    • Glucose 02/05/2021 87    • BUN 02/05/2021 33*   • Creatinine 02/05/2021 1.28*   • Glomerular Filtration Ra* 02/05/2021 43*   • BUN/ Creatinine Ratio 02/05/2021 26*   • Calcium 02/05/2021 9.5    • Bilirubin, Total 02/05/2021 0.3    • GOT/AST 02/05/2021 21    • GPT/ALT 02/05/2021 30    • Alkaline Phosphatase 02/05/2021 66     • Albumin 02/05/2021 3.8    • Protein, Total 02/05/2021 6.8    • Globulin 02/05/2021 3.0    • A/G Ratio 02/05/2021 1.3    • Magnesium 02/05/2021 1.7    • Prothrombin Time 02/05/2021 10.7    • INR 02/05/2021 1.0    • PTT 02/05/2021 22    • Ventricular Rate EKG/Min* 02/05/2021 79    • Atrial Rate (BPM) 02/05/2021 79    • NM-Interval (MSEC) 02/05/2021 170    • QRS-Interval (MSEC) 02/05/2021 128    • QT-Interval (MSEC) 02/05/2021 422    • QTc 02/05/2021 484    • P Axis (Degrees) 02/05/2021 49    • R Axis (Degrees) 02/05/2021 -50    • T Axis (Degrees) 02/05/2021 105    • REPORT TEXT 02/05/2021                      Value:Normal sinus rhythm  Left axis deviation  Left bundle branch block  Abnormal ECG  No previous ECGs available  Confirmed by LEAH QUICK, [facility] ([id number]) on 2/5/2021 4:07:07 PM     • WBC 02/05/2021 5.1    • RBC 02/05/2021 3.38*   • HGB 02/05/2021 9.5*   • HCT 02/05/2021 30.9*   • MCV 02/05/2021 91.4    • MCH 02/05/2021 28.1    • MCHC 02/05/2021 30.7*   • RDW-CV 02/05/2021 14.0    • RDW-SD 02/05/2021 46.5    • PLT 02/05/2021 208    • NRBC 02/05/2021 0    • Neutrophil, Percent 02/05/2021 65    • Lymphocytes, Percent 02/05/2021 26    • Mono, Percent 02/05/2021 7    • Eosinophils, Percent 02/05/2021 2    • Basophils, Percent 02/05/2021 0    • Immature Granulocytes 02/05/2021 0    • Absolute Neutrophils 02/05/2021 3.2    • Absolute Lymphocytes 02/05/2021 1.3    • Absolute Monocytes 02/05/2021 0.4    • Absolute Eosinophils  02/05/2021 0.1    • Absolute Basophils 02/05/2021 0.0    • Absolute Immmature Granu* 02/05/2021 0.0    • Rapid SARS-COV-2 by PCR 02/05/2021 Not Detected    • Isolation Guidelines 02/05/2021     • Procedural Comment 02/05/2021          LAST MRI:  No results found for this or any previous visit.    LAST CT:  No results found for this or any previous visit.    LAST X-RAY:  02/05/21   XR CHEST PA OR AP 1 VIEW  Narrative  EXAM: XR CHEST PA OR AP 1 VIEWCLINICAL INDICATION: GI bleeding.COMPARISON:  and patient is in no acute distress. Normal EKG, CXR and troponin. Negative Ddimer. For his lip cellulitis, there is no fluctuance, will switch patient to augmentin, use warm packs and have him follow-up with orofacial surgery. Pain is not cardiopulmonary in nature and patient is safe to discharge to home.      I have reviewed the nursing notes.    I have reviewed the findings, diagnosis, plan and need for follow up with the patient.  This data collected with the Resident working in the Emergency Department.  Patient was seen and evaluated by myself and I repeated the history and physical exam with the patient.  The plan of care was discussed with them.  The key portions of the note including the entire assessment and plan reflect my documentation.        New Prescriptions    AMOXICILLIN-CLAVULANATE (AUGMENTIN) 875-125 MG PER TABLET    Take 1 tablet by mouth 2 times daily for 5 days       Final diagnoses:   Non-cardiac chest pain     Migdalia Oh MD  [facility] PGY1  8/28/2018   [facility], EMERGENCY DEPARTMENT     Jose Holman MD  08/30/18 2724     None.FINDINGS: Cardiomediastinal and hilar contours are within normal limits. There is no focal lung consolidation, pleural effusion, or pneumothorax. There is multilevel thoracic degenerative disc disease.  Impression  No acute cardiopulmonary process. Electronically Signed by: DENNIS LAND M.D. Signed on: 2/5/2021 2:51 PM     LAST U/S:  No results found for this or any previous visit.

## 2021-02-10 ENCOUNTER — THERAPY VISIT (OUTPATIENT)
Dept: PHYSICAL THERAPY | Facility: CLINIC | Age: 36
End: 2021-02-10
Payer: OTHER MISCELLANEOUS

## 2021-02-10 DIAGNOSIS — M25.511 RIGHT ANTERIOR SHOULDER PAIN: ICD-10-CM

## 2021-02-10 PROCEDURE — 97010 HOT OR COLD PACKS THERAPY: CPT | Mod: GP | Performed by: PHYSICAL THERAPIST

## 2021-02-10 PROCEDURE — 97112 NEUROMUSCULAR REEDUCATION: CPT | Mod: GP | Performed by: PHYSICAL THERAPIST

## 2021-02-10 PROCEDURE — 97110 THERAPEUTIC EXERCISES: CPT | Mod: GP | Performed by: PHYSICAL THERAPIST

## 2021-02-10 NOTE — PROGRESS NOTES
Subjective:  HPI  Physical Exam                    Objective:  System    Physical Exam    General     ROS    Assessment/Plan:    PROGRESS  REPORT    Progress reporting period is from 1/5/2021 to 2/10/2021.       SUBJECTIVE  The shoulder is getting much better.  Stronger and improved ability to do my job.  I am very careful with it and the pain will just come up to a 5/10 through the joint.  Pain continues daily at a level of 5-6/10, even sometimes at work.  15# lifting restriction at work.  Return to the MD 4 weeks from last visit-     Current pain level is 5- 6/10.     Previous pain level was 8/10.   Changes in function:  Yes (See Goal flowsheet attached for changes in current functional level)  Adverse reaction to treatment or activity: treatment - ice burn from home application R arm, activity - pain with overhead activity (wall stretching)    OBJECTIVE  Changes noted in objective findings:  The objective findings below are from DOS 2/10/2021.  -Encouraged resistance of water bottle with TE vs reported 4# for home.  Continued pain with thumb in neutral, abolished with thumb up positioning with flexion strengthening.      Post tx: R shoulder    ROM: flexion 155 deg, abd 140, IR/ext T7 reports sharp pain at end range  MMT: flexion 4/5, abd 4+/5, IR 5/5, ER 4-/5.      ASSESSMENT/PLAN  Updated problem list and treatment plan: Diagnosis 1:  R shoulder pain    Pain -  hot/cold therapy, US, manual therapy, self management, education and home program  Decreased ROM/flexibility - manual therapy and therapeutic exercise  Decreased joint mobility - manual therapy and therapeutic exercise  Decreased strength - therapeutic exercise and therapeutic activities  Decreased proprioception - neuro re-education and therapeutic activities  Inflammation - cold therapy, US, electric stimulation and self management/home program  Impaired muscle performance - neuro re-education  Decreased function - therapeutic activities  Impaired  posture - neuro re-education  STG/LTGs have been met or progress has been made towards goals:  Yes (See Goal flow sheet completed today.)  Assessment of Progress: The patient's condition is improving.  Self Management Plans:  Patient has been instructed in a home treatment program.  Patient  has been instructed in self management of symptoms.  I have re-evaluated this patient and find that the nature, scope, duration and intensity of the therapy is appropriate for the medical condition of the patient.  Ric continues to require the following intervention to meet STG and LTG's:  PT    Recommendations:  This patient would benefit from continued therapy.     Frequency:  1 X week, once daily  Duration:  for 6 weeks        Please refer to the daily flowsheet for treatment today, total treatment time and time spent performing 1:1 timed codes.

## 2021-02-18 ENCOUNTER — THERAPY VISIT (OUTPATIENT)
Dept: PHYSICAL THERAPY | Facility: CLINIC | Age: 36
End: 2021-02-18
Payer: OTHER MISCELLANEOUS

## 2021-02-18 DIAGNOSIS — M25.511 RIGHT ANTERIOR SHOULDER PAIN: Primary | ICD-10-CM

## 2021-02-18 DIAGNOSIS — M25.511 ACUTE PAIN OF RIGHT SHOULDER: ICD-10-CM

## 2021-02-18 PROCEDURE — 97110 THERAPEUTIC EXERCISES: CPT | Mod: GP | Performed by: PHYSICAL THERAPIST

## 2021-02-18 PROCEDURE — 97140 MANUAL THERAPY 1/> REGIONS: CPT | Mod: GP | Performed by: PHYSICAL THERAPIST

## 2021-02-18 PROCEDURE — 97112 NEUROMUSCULAR REEDUCATION: CPT | Mod: GP | Performed by: PHYSICAL THERAPIST

## 2021-02-18 PROCEDURE — 97010 HOT OR COLD PACKS THERAPY: CPT | Mod: GP | Performed by: PHYSICAL THERAPIST

## 2021-02-22 ENCOUNTER — TELEPHONE (OUTPATIENT)
Dept: ORTHOPEDICS | Facility: CLINIC | Age: 36
End: 2021-02-22

## 2021-02-22 NOTE — TELEPHONE ENCOUNTER
Reason for call:  Other   Patient called regarding (reason for call): appointment  Additional comments: Wants to be seen for right shoulder pain and requesting an MRI    Phone number to reach patient:  Cell number on file:    Telephone Information:   Mobile [phone]       Best Time:  Anytime    Can we leave a detailed message on this number?  YES    Travel screening: Not Applicable

## 2021-02-23 ENCOUNTER — ANCILLARY PROCEDURE (OUTPATIENT)
Dept: GENERAL RADIOLOGY | Facility: CLINIC | Age: 36
End: 2021-02-23
Attending: PEDIATRICS
Payer: OTHER MISCELLANEOUS

## 2021-02-23 ENCOUNTER — OFFICE VISIT (OUTPATIENT)
Dept: ORTHOPEDICS | Facility: CLINIC | Age: 36
End: 2021-02-23
Payer: OTHER MISCELLANEOUS

## 2021-02-23 ENCOUNTER — THERAPY VISIT (OUTPATIENT)
Dept: PHYSICAL THERAPY | Facility: CLINIC | Age: 36
End: 2021-02-23
Payer: OTHER MISCELLANEOUS

## 2021-02-23 VITALS
WEIGHT: 150 LBS | SYSTOLIC BLOOD PRESSURE: 122 MMHG | BODY MASS INDEX: 25.61 KG/M2 | DIASTOLIC BLOOD PRESSURE: 74 MMHG | HEIGHT: 64 IN

## 2021-02-23 DIAGNOSIS — M25.511 RIGHT ANTERIOR SHOULDER PAIN: ICD-10-CM

## 2021-02-23 DIAGNOSIS — S49.91XA INJURY OF RIGHT SHOULDER, INITIAL ENCOUNTER: ICD-10-CM

## 2021-02-23 DIAGNOSIS — S49.91XA INJURY OF RIGHT SHOULDER, INITIAL ENCOUNTER: Primary | ICD-10-CM

## 2021-02-23 PROCEDURE — 97110 THERAPEUTIC EXERCISES: CPT | Mod: GP | Performed by: PHYSICAL THERAPIST

## 2021-02-23 PROCEDURE — 99204 OFFICE O/P NEW MOD 45 MIN: CPT | Performed by: PEDIATRICS

## 2021-02-23 PROCEDURE — 73030 X-RAY EXAM OF SHOULDER: CPT | Mod: RT | Performed by: RADIOLOGY

## 2021-02-23 PROCEDURE — 97112 NEUROMUSCULAR REEDUCATION: CPT | Mod: GP | Performed by: PHYSICAL THERAPIST

## 2021-02-23 PROCEDURE — 97530 THERAPEUTIC ACTIVITIES: CPT | Mod: GP | Performed by: PHYSICAL THERAPIST

## 2021-02-23 ASSESSMENT — MIFFLIN-ST. JEOR: SCORE: 1516.4

## 2021-02-23 NOTE — PATIENT INSTRUCTIONS
MRI right shoulder next  Updated letter for work  Continue with home exercises from physical therapy  Return to clinic 2-3 days after MRI to review MRI findings and discuss plan of care    If you have any further questions for your physician or physician s care team you can call [phone] and use option 3 to leave a voice message. Calls received during business hours will be returned same day.      Advanced imaging is done by appointment. Some insurance require a prior authorization to be completed which may delay the time until you are able to schedule your appointment.   Please call [place] [place] [facility] [place]: [phone] to schedule your MRI.  Depending on your availability you can usually schedule within the next 1-2 days.    Please make a follow up appointment in the clinic at least 2 days following your MRI by calling [phone].

## 2021-02-23 NOTE — LETTER
[facility] [place]  [address]  [place] [address]  Phone: [phone]  Fax: [phone]      February 23, 2021      RE: Ric Izaguirre  [address]  [place] [address]        To whom it may concern:    Ric Izaguirre was seen in clinic today. The employee is ABLE to return to work next scheduled work date.    When the patient returns to work, the following restrictions apply:  Continue with previously placed restrictions, including the following:  Restricted lifting - Maximum lift in pounds: 15 lbs for right extermity  Limited repetitive motion.  limited range of motion above shoulder.   Plan MRI of the right shoulder next, given ongoing symptoms despite physical therapy.    Sincerely,            Hector Cloud DO, CAQ

## 2021-02-23 NOTE — LETTER
2/23/2021         RE: Ric Izaguirre  [address]  [place] [address]        Dear Colleague,    Thank you for referring your patient, Ric Izaguirre, to the [facility] [place]. Please see a copy of my visit note below.    ASSESSMENT & PLAN    Ric was seen today for injury.    Diagnoses and all orders for this visit:    Injury of right shoulder, initial encounter  -     XR Shoulder Right G/E 3 Views; Future  -     MR Shoulder Right w/o Contrast; Future      Ongoing right shoulder pain and limited motion after injury.  We discussed the following: symptom treatment, activity modification/rest, imaging, rehab and injection therapy. Following discussion, plan:  MRI right shoulder next. He is interested in further imaging, though is relieved with negative x-ray. Potential cuff strain.  Activity modification reviewed. Updated work letter, continue with current restrictions.  Briefly discussed consideration of injection. Hold currently, await MRI.  F/u after MRI.  Questions answered. Discussed signs and symptoms that may indicate more serious issues; the patient was instructed to seek appropriate care if noted. Ric indicates understanding of these issues and agrees with the plan.        See Patient Instructions  Patient Instructions   MRI right shoulder next  Updated letter for work  Continue with home exercises from physical therapy  Return to clinic 2-3 days after MRI to review MRI findings and discuss plan of care    If you have any further questions for your physician or physician s care team you can call [phone] and use option 3 to leave a voice message. Calls received during business hours will be returned same day.      Advanced imaging is done by appointment. Some insurance require a prior authorization to be completed which may delay the time until you are able to schedule your appointment.   Please call [facility] [place] [facility] [place]:  [phone] to schedule your MRI.  Depending on your availability you can usually schedule within the next 1-2 days.    Please make a follow up appointment in the clinic at least 2 days following your MRI by calling [phone].            Hector Cloud DO  [facility] [place]    -----  Chief Complaint   Patient presents with     Right Shoulder - Injury       SUBJECTIVE  Ric Izaguirre is a/an 35 year old male who is seen as a self referral for evaluation of his right shoulder.  He was helping a resident, having to restrain him and following this he had pain in his right shoulder.  He does not recall the exact date but states it was a Friday in December.  He had to work the weekend and had an increase in pain in his right shoulder following working.    He has been doing PT.  Continues to have pain in his shoulder, feels that it is grinding.  Pain is diffuse thru the shoulder.    Pain with sleeping.     He is interested in an MRI     The patient is seen by themselves.  The patient is Right handed    Onset: > 2 month(s) ago. Patient describes injury as restraining a resident   Location of Pain: right shoulder   Worsened by: use   Better with: minimal relief from PT.    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     **  Was assisting a resident who was moving to chair, resident's LE gave way, and pt held up the resident. Then more assistance arrived, and they were able to get resident down.  Had some initial pain. Continued with work duties, continued working 2-3 days.    Doing PT. PT notes reviewed.    Still notes pain at right shoulder. Notes nighttime pain.  Feels like something is rubbing in right shoulder.     REVIEW OF SYSTEMS:  Review of  "Systems   All other systems reviewed and are negative.        OBJECTIVE:  /74   Ht 1.626 m (5' 4\")   Wt 68 kg (150 lb)   BMI 25.75 kg/m     General: healthy, alert and in no distress  HEENT: no scleral icterus or conjunctival erythema  Skin: no suspicious lesions or rash. No jaundice.  CV: distal perfusion intact  Resp: normal respiratory effort without conversational dyspnea   Psych: normal mood and affect  Gait: normal steady gait with appropriate coordination and balance   Neuro: Normal light sensory exam of upper extremity       Right Shoulder exam    ROM:        forward flexion lacking 5-10 deg compared to left        abduction ~110       internal rotation back pocket       external rotation grossly full    Strength:        abduction 5/5       internal rotation 5/5       external rotation 5-/5    Impingement testing:        Mild pain with Neer       positive (+) [place]       positive (+) empty can       Pain with crossover       positive (+) O'jim    Skin:        no visible deformities       well perfused       capillary refill brisk    Sensation:        normal sensation over shoulder and upper extremity     RADIOLOGY:  I independently ordered, visualized and reviewed these images with the patient    Recent Results (from the past 24 hour(s))   XR Shoulder Right G/E 3 Views    Narrative    XR SHOULDER RT G/E 3 V[facility] 2/23/2021 9:55 AM     HISTORY: Injury of right shoulder, initial encounter      Impression    IMPRESSION: Negative exam.    EDSON YU MD         Review of prior external note(s) from - Outside records from PT, primary care  Review of external notes as documented elsewhere in note  Review of the result(s) of each unique test - x-ray    36 minutes spent on the date of the encounter doing chart review, history and exam, documentation and further activities as noted above             Again, thank you for allowing me to participate in the care of your patient.        Sincerely,        Hector" Nathanael Cloud DO

## 2021-02-23 NOTE — PROGRESS NOTES
ASSESSMENT & PLAN    Ric was seen today for injury.    Diagnoses and all orders for this visit:    Injury of right shoulder, initial encounter  -     XR Shoulder Right G/E 3 Views; Future  -     MR Shoulder Right w/o Contrast; Future      Ongoing right shoulder pain and limited motion after injury.  We discussed the following: symptom treatment, activity modification/rest, imaging, rehab and injection therapy. Following discussion, plan:  MRI right shoulder next. He is interested in further imaging, though is relieved with negative x-ray. Potential cuff strain.  Activity modification reviewed. Updated work letter, continue with current restrictions.  Briefly discussed consideration of injection. Hold currently, await MRI.  F/u after MRI.  Questions answered. Discussed signs and symptoms that may indicate more serious issues; the patient was instructed to seek appropriate care if noted. Ric indicates understanding of these issues and agrees with the plan.        See Patient Instructions  Patient Instructions   MRI right shoulder next  Updated letter for work  Continue with home exercises from physical therapy  Return to clinic 2-3 days after MRI to review MRI findings and discuss plan of care    If you have any further questions for your physician or physician s care team you can call [phone] and use option 3 to leave a voice message. Calls received during business hours will be returned same day.      Advanced imaging is done by appointment. Some insurance require a prior authorization to be completed which may delay the time until you are able to schedule your appointment.   Please call [place] [place] [facility] [place]: [phone] to schedule your MRI.  Depending on your availability you can usually schedule within the next 1-2 days.    Please make a follow up appointment in the clinic at least 2 days following your MRI by calling [phone].            DO BETO Hurley  "[facility] [place]    -----  Chief Complaint   Patient presents with     Right Shoulder - Injury       SUBJECTIVE  Ric Izaguirre is a/an 35 year old male who is seen as a self referral for evaluation of his right shoulder.  He was helping a resident, having to restrain him and following this he had pain in his right shoulder.  He does not recall the exact date but states it was a Friday in December.  He had to work the weekend and had an increase in pain in his right shoulder following working.    He has been doing PT.  Continues to have pain in his shoulder, feels that it is grinding.  Pain is diffuse thru the shoulder.    Pain with sleeping.     He is interested in an MRI     The patient is seen by themselves.  The patient is Right handed    Onset: > 2 month(s) ago. Patient describes injury as restraining a resident   Location of Pain: right shoulder   Worsened by: use   Better with: minimal relief from PT.    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     **  Was assisting a resident who was moving to chair, resident's LE gave way, and pt held up the resident. Then more assistance arrived, and they were able to get resident down.  Had some initial pain. Continued with work duties, continued working 2-3 days.    Doing PT. PT notes reviewed.    Still notes pain at right shoulder. Notes nighttime pain.  Feels like something is rubbing in right shoulder.     REVIEW OF SYSTEMS:  Review of Systems   All other systems reviewed and are negative.        OBJECTIVE:  /74   Ht 1.626 m (5' 4\")   Wt 68 kg (150 lb)   BMI 25.75 kg/m     General: healthy, alert and in no distress  HEENT: no scleral icterus or conjunctival erythema  Skin: no suspicious lesions or " rash. No jaundice.  CV: distal perfusion intact  Resp: normal respiratory effort without conversational dyspnea   Psych: normal mood and affect  Gait: normal steady gait with appropriate coordination and balance   Neuro: Normal light sensory exam of upper extremity       Right Shoulder exam    ROM:        forward flexion lacking 5-10 deg compared to left        abduction ~110       internal rotation back pocket       external rotation grossly full    Strength:        abduction 5/5       internal rotation 5/5       external rotation 5-/5    Impingement testing:        Mild pain with Neer       positive (+) [place]       positive (+) empty can       Pain with crossover       positive (+) O'jim    Skin:        no visible deformities       well perfused       capillary refill brisk    Sensation:        normal sensation over shoulder and upper extremity     RADIOLOGY:  I independently ordered, visualized and reviewed these images with the patient    Recent Results (from the past 24 hour(s))   XR Shoulder Right G/E 3 Views    Narrative    XR SHOULDER RT G/E 3 [facility] 2/23/2021 9:55 AM     HISTORY: Injury of right shoulder, initial encounter      Impression    IMPRESSION: Negative exam.    EDSON YU MD         Review of prior external note(s) from - Outside records from PT, primary care  Review of external notes as documented elsewhere in note  Review of the result(s) of each unique test - x-ray    36 minutes spent on the date of the encounter doing chart review, history and exam, documentation and further activities as noted above

## 2021-02-27 ENCOUNTER — HOSPITAL ENCOUNTER (OUTPATIENT)
Dept: MRI IMAGING | Facility: CLINIC | Age: 36
Discharge: HOME | End: 2021-02-27
Attending: PEDIATRICS | Admitting: PEDIATRICS
Payer: OTHER MISCELLANEOUS

## 2021-02-27 DIAGNOSIS — S49.91XA INJURY OF RIGHT SHOULDER, INITIAL ENCOUNTER: ICD-10-CM

## 2021-02-27 PROCEDURE — 73221 MRI JOINT UPR EXTREM W/O DYE: CPT | Mod: 26 | Performed by: RADIOLOGY

## 2021-02-27 PROCEDURE — 73221 MRI JOINT UPR EXTREM W/O DYE: CPT | Mod: RT

## 2021-03-02 ENCOUNTER — OFFICE VISIT (OUTPATIENT)
Dept: ORTHOPEDICS | Facility: CLINIC | Age: 36
End: 2021-03-02
Payer: OTHER MISCELLANEOUS

## 2021-03-02 ENCOUNTER — THERAPY VISIT (OUTPATIENT)
Dept: PHYSICAL THERAPY | Facility: CLINIC | Age: 36
End: 2021-03-02
Payer: OTHER MISCELLANEOUS

## 2021-03-02 VITALS
SYSTOLIC BLOOD PRESSURE: 110 MMHG | HEIGHT: 64 IN | DIASTOLIC BLOOD PRESSURE: 64 MMHG | WEIGHT: 150 LBS | BODY MASS INDEX: 25.61 KG/M2

## 2021-03-02 DIAGNOSIS — S49.91XD INJURY OF RIGHT SHOULDER, SUBSEQUENT ENCOUNTER: Primary | ICD-10-CM

## 2021-03-02 DIAGNOSIS — M25.511 RIGHT ANTERIOR SHOULDER PAIN: ICD-10-CM

## 2021-03-02 PROCEDURE — 20610 DRAIN/INJ JOINT/BURSA W/O US: CPT | Mod: RT | Performed by: PEDIATRICS

## 2021-03-02 PROCEDURE — 97112 NEUROMUSCULAR REEDUCATION: CPT | Mod: GP | Performed by: PHYSICAL THERAPIST

## 2021-03-02 PROCEDURE — 99213 OFFICE O/P EST LOW 20 MIN: CPT | Mod: 25 | Performed by: PEDIATRICS

## 2021-03-02 PROCEDURE — 97110 THERAPEUTIC EXERCISES: CPT | Mod: GP | Performed by: PHYSICAL THERAPIST

## 2021-03-02 RX ADMIN — LIDOCAINE HYDROCHLORIDE 2 ML: 10 INJECTION, SOLUTION INFILTRATION; PERINEURAL at 10:20

## 2021-03-02 RX ADMIN — TRIAMCINOLONE ACETONIDE 40 MG: 40 INJECTION, SUSPENSION INTRA-ARTICULAR; INTRAMUSCULAR at 10:20

## 2021-03-02 ASSESSMENT — MIFFLIN-ST. JEOR: SCORE: 1516.4

## 2021-03-02 NOTE — PROGRESS NOTES
Sports Medicine Clinic Visit      Assessment:  1. Injury of right shoulder, subsequent encounter        Plan:  Discussed the assessment with the patient.  Discussed nature of findings on MRI. Cuff changes, no full thickness tear.  Discussed symptom management, activity modification, rehab, trial of injection.  Will plan return to PT.  He is interested in injection. Subacromial steroid injection done today. After care reviewed.  Continue with work restrictions, updated letter provided.  Follow up: 4 weeks, sooner prn  Questions answered. Discussed signs and symptoms that may indicate more serious issues; the patient was instructed to seek appropriate care if noted. Beliadextersavanajerrell indicates understanding of these issues and agrees with the plan.      Hector Cloud, DO, CAQ            Patient Instructions       Injection Discharge Instructions      You may shower, however avoid swimming, tub baths or hot tubs for 24 hours following your procedure    You may have a mild to moderate increase in pain for several days following the injection.    It may take up to 14 days for the steroid medication to start working although you may feel the effect as early as a few days after the procedure.    You may use ice packs for 10-15 minutes, 3 to 4 times a day at the injection site for comfort    You may use anti-inflammatory medications (such as Ibuprofen or Aleve or Advil) if you are able to take safely, or acetaminophen (Tylenol) for pain control if necessary    If you were fasting, you may resume your normal diet and medications after the procedure    If you have diabetes, check your blood sugar more frequently than usual as your blood sugar may be higher than normal for 10-14 days following a steroid injection. Contact your doctor who manages your diabetes if your blood sugar is higher than usual      If you experience any of the following, call the clinic @ [phone]  - Fever over 100 degree F  - Swelling, bleeding,  redness, drainage, warmth at the injection site  - New or worsening pain          Hector Cloud DO, CAQ        ==========================================      PCP: Yesenia Silva    Ric Izaguirre is a 35 year old male who is seen in f/u up for Injury of right shoulder, subsequent encounter. Since last visit on 2/23/2021 patient has undergone an MRI.  Here today to review results.  Has continued to attend PT.  Does feel there has been some improvement in his pain.   Here today with his QRC.  .       Review of Systems  All other systems reviewed and are negative unless noted above.    Past Medical History:   Diagnosis Date     Abscess     left lower lip     NO ACTIVE PROBLEMS      Past Surgical History:   Procedure Laterality Date     NO HISTORY OF SURGERY       Family History   Problem Relation Age of Onset     Diabetes No family hx of      Coronary Artery Disease No family hx of      Hypertension No family hx of      Hyperlipidemia No family hx of      Cerebrovascular Disease No family hx of      Breast Cancer No family hx of      Colon Cancer No family hx of      Prostate Cancer No family hx of      Social History     Socioeconomic History     Marital status:      Spouse name: Not on file     Number of children: Not on file     Years of education: Not on file     Highest education level: Not on file   Occupational History     Not on file   Social Needs     Financial resource strain: Not on file     Food insecurity     Worry: Not on file     Inability: Not on file     Transportation needs     Medical: Not on file     Non-medical: Not on file   Tobacco Use     Smoking status: Never Smoker     Smokeless tobacco: Never Used   Substance and Sexual Activity     Alcohol use: No     Drug use: No     Sexual activity: Yes     Partners: Female   Lifestyle     Physical activity     Days per week: Not on file     Minutes per session: Not on file     Stress: Not on file   Relationships     Social  "connections     Talks on phone: Not on file     Gets together: Not on file     Attends Judaism service: Not on file     Active member of club or organization: Not on file     Attends meetings of clubs or organizations: Not on file     Relationship status: Not on file     Intimate partner violence     Fear of current or ex partner: Not on file     Emotionally abused: Not on file     Physically abused: Not on file     Forced sexual activity: Not on file   Other Topics Concern     Parent/sibling w/ CABG, MI or angioplasty before 65F 55M? No   Social History Narrative     Not on file         Objective  /64   Ht 1.626 m (5' 4\")   Wt 68 kg (150 lb)   BMI 25.75 kg/m      GENERAL APPEARANCE: healthy, alert and no distress   GAIT: NORMAL  SKIN: no suspicious lesions or rashes  NEURO: Normal strength and tone, mentation intact and speech normal  PSYCH:  mentation appears normal and affect normal/bright  HEENT: no scleral icterus  CV: distal perfusion intact  RESP: nonlabored breathing      Exam  Additional deferred in lieu of discussion      Radiology  Visualized the MRI study noted below, and reviewed the images and the report with the patient.  tendinopathy.    Results for orders placed or performed during the hospital encounter of 02/27/21   MR Shoulder Right w/o Contrast    Narrative    EXAM: MR Right shoulder without  contrast 2/27/2021 10:24 AM    TECHNIQUE: Multiplanar, multisequence imaging of the right shoulder  were obtained without administration of intravenous or intra-articular  gadolinium contrast using routine protocol.    History: Shoulder pain, rotator cuff disorder suspected, xray done;  Injury of right shoulder, initial encounter     Comparison: Radiographs 2/23/2021    Findings:    ROTATOR CUFF and ASSOCIATED STRUCTURES  Rotator cuff: Supraspinatus tendinosis/strain without tear. Low to  moderate grade bursal sided fraying of the mid infraspinatus at the  footprint. No full-thickness tear. Teres " minor and subscapularis are  intact.    Bursa: No subacromial or subdeltoid bursal fluid.    Musculature: Muscle bulk of rotator cuff is preserved.  Deltoid muscle  bulk is also preserved.  No muscle edema.    Acromioclavicular joint  There are mild degenerative changes of the acromioclavicular joint.  Acromion is type 2 in sagittal morphology.  Coracoacromial ligament is  not thickened.    OSSEOUS STRUCTURES  Focal bone marrow edema within the lateral humeral head greater  tuberosity  Insertion sites of the posterior supraspinatus-anterior infraspinatus.  No fracture.    LONG BICIPITAL TENDON  The long head of the biceps tendon is normally situated within the  bicipital groove. No complete or partial biceps tendon tear is  present.    GLENOHUMERAL JOINT  Joint fluid: Physiologic amount of joint fluid is  present.    Cartilage and subarticular bone:  No focal hyaline cartilage defects  are noted. No Hill-Sachs, reverse Hill-Sachs, or bony Bankart lesions  are seen.    Labrum: Limited assessment on this study with relative lack of joint  distention shows no labral tear.    ANCILLARY FINDINGS:  None      Impression    Impression:    1. Low to moderate grade bursal sided fraying of the mid infraspinatus  at the footprint.    2. Supraspinatus tendinosis without tear.    3. Focal edema within the greater tuberosity at the insertion site of  the posterior supraspinatus-anterior infraspinatus. No fracture. Bone  marrow edema may be related to contusion or enthesopathic change.    CELSO BERRY MD ([place])         Large Joint Injection/Arthocentesis: R subacromial bursa    Date/Time: 3/2/2021 10:20 AM  Performed by: Hector Cloud DO  Authorized by: Hector Cloud DO     Indications:  Pain  Needle Size:  25 G  Guidance: landmark guided    Approach:  Posterolateral  Location:  Shoulder      Site:  R subacromial bursa  Medications:  40 mg triamcinolone 40 MG/ML; 2 mL lidocaine 1 %  Outcome:  Tolerated  well, no immediate complications  Procedure discussed: discussed risks, benefits, and alternatives    Consent Given by:  Patient  Timeout: timeout called immediately prior to procedure    Prep: patient was prepped and draped in usual sterile fashion          Note: Time spent in one-on-one evaluation and discussion with the patient regarding the nature of problem, course, prior treatments, and therapeutic options, along with review of medical record (including outside sources/documentation), and completing documentation: 20 minutes, not including injection.      This note consists of symbols derived from keyboarding, dictation and/or voice recognition software. As a result, there may be errors in the script that have gone undetected. Please consider this when interpreting information found in this chart.

## 2021-03-02 NOTE — LETTER
[facility] [place]  [address] [address]  [address]  [place] [address]  Phone: [phone]  Fax: [phone]      March 2, 2021      RE: Ric Izaguirre  [address] [facility]  [place] [address]        To whom it may concern:    Ric Izaguirre was seen in clinic today. The employee is ABLE to return to work next scheduled work date.    When the patient returns to work, the following restrictions apply:  Continue with previously placed restrictions, including the following:  Restricted lifting - Maximum lift in pounds: 15 lbs for right extermity  Limited repetitive motion.  limited range of motion above shoulder.   Right subacromial steroid injection done today. Continue with physical therapy.  Restrictions in place 4 weeks.    Sincerely,            Hector Cloud DO, CAQ

## 2021-03-02 NOTE — PATIENT INSTRUCTIONS
Injection Discharge Instructions      You may shower, however avoid swimming, tub baths or hot tubs for 24 hours following your procedure    You may have a mild to moderate increase in pain for several days following the injection.    It may take up to 14 days for the steroid medication to start working although you may feel the effect as early as a few days after the procedure.    You may use ice packs for 10-15 minutes, 3 to 4 times a day at the injection site for comfort    You may use anti-inflammatory medications (such as Ibuprofen or Aleve or Advil) if you are able to take safely, or acetaminophen (Tylenol) for pain control if necessary    If you were fasting, you may resume your normal diet and medications after the procedure    If you have diabetes, check your blood sugar more frequently than usual as your blood sugar may be higher than normal for 10-14 days following a steroid injection. Contact your doctor who manages your diabetes if your blood sugar is higher than usual      If you experience any of the following, call the clinic @ [phone]  - Fever over 100 degree F  - Swelling, bleeding, redness, drainage, warmth at the injection site  - New or worsening pain

## 2021-03-02 NOTE — PROGRESS NOTES
Subjective:  HPI  Physical Exam                    Objective:  System    Physical Exam    General     ROS    Assessment/Plan:    PROGRESS  REPORT    Progress reporting period is from 2/10/2021 to 3/2/2021.       SUBJECTIVE  Subjective changes noted by patient:  The shoulder is much better than the last time I came.  I have really been working hard on the exercise sheets.  Pain today in the shoulder is like a 5/10.  Continuing to hold lifting >15# at work.   I almost dropped my baby at home due to lack of strength and pain through the arm      Current pain level is 5/10  .     Previous pain level was  6/10.   Changes in function:  Yes (See Goal flowsheet attached for changes in current functional level)  Adverse reaction to treatment or activity: activity - overhead activity    OBJECTIVE  Changes noted in objective findings:  The objective findings below are from DOS 3/2/2021.    Palpation: continued TTP through anterior shoulder.      AROM: flexion R 148, L 155; abd R 114 painful and held at mid position, L 160 deg; IR/ext R T9, L T6/7.      Strength: MMT: IR 5/5, ER 4+/5; flexion 4/5, abd 4/5 guarded.  Reports that the flexion/scaption is weak but definitely not as bad as it used to be.      Other: improved strengthening and form throughout with HEP this week.  Maintain simple program and progress resistance.  Overall improvement     ASSESSMENT/PLAN  Updated problem list and treatment plan: Diagnosis 1:  R shoulder pain following work injury  Pain -  hot/cold therapy, US, manual therapy, self management, education and home program  Decreased ROM/flexibility - manual therapy and therapeutic exercise  Decreased joint mobility - manual therapy and therapeutic exercise  Decreased strength - therapeutic exercise and therapeutic activities  Impaired muscle performance - neuro re-education  Decreased function - therapeutic activities  Impaired posture - neuro re-education  STG/LTGs have been met or progress has been made  towards goals:  Yes (See Goal flow sheet completed today.)  Assessment of Progress: The patient's condition is improving.  Self Management Plans:  Patient has been instructed in a home treatment program.  Patient  has been instructed in self management of symptoms.  I have re-evaluated this patient and find that the nature, scope, duration and intensity of the therapy is appropriate for the medical condition of the patient.  Ric continues to require the following intervention to meet STG and LTG's:  PT    Recommendations:  This patient would benefit from continued therapy.     Frequency:  2-3 X a month, once daily  Duration:  for 2 months    To meet with MD to review MRI findings as well discuss further treatment options.  Possible injection discussed, to further review with MD as well as other pain management considerations.        Please refer to the daily flowsheet for treatment today, total treatment time and time spent performing 1:1 timed codes.

## 2021-03-02 NOTE — LETTER
3/2/2021         RE: Ric Izaguirre  [address]  [place] [address]        Dear Colleague,    Thank you for referring your patient, Ric Izaguirre, to the [facility] [place]. Please see a copy of my visit note below.    Sports Medicine Clinic Visit      Assessment:  1. Injury of right shoulder, subsequent encounter        Plan:  Discussed the assessment with the patient.  Discussed nature of findings on MRI. Cuff changes, no full thickness tear.  Discussed symptom management, activity modification, rehab, trial of injection.  Will plan return to PT.  He is interested in injection. Subacromial steroid injection done today. After care reviewed.  Continue with work restrictions, updated letter provided.  Follow up: 4 weeks, sooner prn  Questions answered. Discussed signs and symptoms that may indicate more serious issues; the patient was instructed to seek appropriate care if noted. Ric indicates understanding of these issues and agrees with the plan.      Hector Cloud, DO, CAQ            Patient Instructions       Injection Discharge Instructions      You may shower, however avoid swimming, tub baths or hot tubs for 24 hours following your procedure    You may have a mild to moderate increase in pain for several days following the injection.    It may take up to 14 days for the steroid medication to start working although you may feel the effect as early as a few days after the procedure.    You may use ice packs for 10-15 minutes, 3 to 4 times a day at the injection site for comfort    You may use anti-inflammatory medications (such as Ibuprofen or Aleve or Advil) if you are able to take safely, or acetaminophen (Tylenol) for pain control if necessary    If you were fasting, you may resume your normal diet and medications after the procedure    If you have diabetes, check your blood sugar more frequently than usual as your blood sugar may be higher than  normal for 10-14 days following a steroid injection. Contact your doctor who manages your diabetes if your blood sugar is higher than usual      If you experience any of the following, call the clinic @ [phone]  - Fever over 100 degree F  - Swelling, bleeding, redness, drainage, warmth at the injection site  - New or worsening pain          Hector Cloud DO, CAQ        ==========================================      PCP: Yesenia Silva    Lenosavanajerrell Izaguirre is a 35 year old male who is seen in f/u up for Injury of right shoulder, subsequent encounter. Since last visit on 2/23/2021 patient has undergone an MRI.  Here today to review results.  Has continued to attend PT.  Does feel there has been some improvement in his pain.   Here today with his QRC.  .       Review of Systems  All other systems reviewed and are negative unless noted above.    Past Medical History:   Diagnosis Date     Abscess     left lower lip     NO ACTIVE PROBLEMS      Past Surgical History:   Procedure Laterality Date     NO HISTORY OF SURGERY       Family History   Problem Relation Age of Onset     Diabetes No family hx of      Coronary Artery Disease No family hx of      Hypertension No family hx of      Hyperlipidemia No family hx of      Cerebrovascular Disease No family hx of      Breast Cancer No family hx of      Colon Cancer No family hx of      Prostate Cancer No family hx of      Social History     Socioeconomic History     Marital status:      Spouse name: Not on file     Number of children: Not on file     Years of education: Not on file     Highest education level: Not on file   Occupational History     Not on file   Social Needs     Financial resource strain: Not on file     Food insecurity     Worry: Not on file     Inability: Not on file     Transportation needs     Medical: Not on file     Non-medical: Not on file   Tobacco Use     Smoking status: Never Smoker     Smokeless tobacco: Never Used  "  Substance and Sexual Activity     Alcohol use: No     Drug use: No     Sexual activity: Yes     Partners: Female   Lifestyle     Physical activity     Days per week: Not on file     Minutes per session: Not on file     Stress: Not on file   Relationships     Social connections     Talks on phone: Not on file     Gets together: Not on file     Attends Ep[employer] service: Not on file     Active member of club or organization: Not on file     Attends meetings of clubs or organizations: Not on file     Relationship status: Not on file     Intimate partner violence     Fear of current or ex partner: Not on file     Emotionally abused: Not on file     Physically abused: Not on file     Forced sexual activity: Not on file   Other Topics Concern     Parent/sibling w/ CABG, MI or angioplasty before 65F 55M? No   Social History Narrative     Not on file         Objective  /64   Ht 1.626 m (5' 4\")   Wt 68 kg (150 lb)   BMI 25.75 kg/m      GENERAL APPEARANCE: healthy, alert and no distress   GAIT: NORMAL  SKIN: no suspicious lesions or rashes  NEURO: Normal strength and tone, mentation intact and speech normal  PSYCH:  mentation appears normal and affect normal/bright  HEENT: no scleral icterus  CV: distal perfusion intact  RESP: nonlabored breathing      Exam  Additional deferred in lieu of discussion      Radiology  Visualized the MRI study noted below, and reviewed the images and the report with the patient.  tendinopathy.    Results for orders placed or performed during the hospital encounter of 02/27/21   MR Shoulder Right w/o Contrast    Narrative    EXAM: MR Right shoulder without  contrast 2/27/2021 10:24 AM    TECHNIQUE: Multiplanar, multisequence imaging of the right shoulder  were obtained without administration of intravenous or intra-articular  gadolinium contrast using routine protocol.    History: Shoulder pain, rotator cuff disorder suspected, xray done;  Injury of right shoulder, initial encounter "     Comparison: Radiographs 2/23/2021    Findings:    ROTATOR CUFF and ASSOCIATED STRUCTURES  Rotator cuff: Supraspinatus tendinosis/strain without tear. Low to  moderate grade bursal sided fraying of the mid infraspinatus at the  footprint. No full-thickness tear. Teres minor and subscapularis are  intact.    Bursa: No subacromial or subdeltoid bursal fluid.    Musculature: Muscle bulk of rotator cuff is preserved.  Deltoid muscle  bulk is also preserved.  No muscle edema.    Acromioclavicular joint  There are mild degenerative changes of the acromioclavicular joint.  Acromion is type 2 in sagittal morphology.  Coracoacromial ligament is  not thickened.    OSSEOUS STRUCTURES  Focal bone marrow edema within the lateral humeral head greater  tuberosity  Insertion sites of the posterior supraspinatus-anterior infraspinatus.  No fracture.    LONG BICIPITAL TENDON  The long head of the biceps tendon is normally situated within the  bicipital groove. No complete or partial biceps tendon tear is  present.    GLENOHUMERAL JOINT  Joint fluid: Physiologic amount of joint fluid is  present.    Cartilage and subarticular bone:  No focal hyaline cartilage defects  are noted. No Hill-Sachs, reverse Hill-Sachs, or bony Bankart lesions  are seen.    Labrum: Limited assessment on this study with relative lack of joint  distention shows no labral tear.    ANCILLARY FINDINGS:  None      Impression    Impression:    1. Low to moderate grade bursal sided fraying of the mid infraspinatus  at the footprint.    2. Supraspinatus tendinosis without tear.    3. Focal edema within the greater tuberosity at the insertion site of  the posterior supraspinatus-anterior infraspinatus. No fracture. Bone  marrow edema may be related to contusion or enthesopathic change.    CELSO BERRY MD ([place])         Large Joint Injection/Arthocentesis: R subacromial bursa    Date/Time: 3/2/2021 10:20 AM  Performed by: Hector Cloud DO Authorized  by: Hector Cloud DO     Indications:  Pain  Needle Size:  25 G  Guidance: landmark guided    Approach:  Posterolateral  Location:  Shoulder      Site:  R subacromial bursa  Medications:  40 mg triamcinolone 40 MG/ML; 2 mL lidocaine 1 %  Outcome:  Tolerated well, no immediate complications  Procedure discussed: discussed risks, benefits, and alternatives    Consent Given by:  Patient  Timeout: timeout called immediately prior to procedure    Prep: patient was prepped and draped in usual sterile fashion          Note: Time spent in one-on-one evaluation and discussion with the patient regarding the nature of problem, course, prior treatments, and therapeutic options, along with review of medical record (including outside sources/documentation), and completing documentation: 20 minutes, not including injection.      This note consists of symbols derived from keyboarding, dictation and/or voice recognition software. As a result, there may be errors in the script that have gone undetected. Please consider this when interpreting information found in this chart.          Again, thank you for allowing me to participate in the care of your patient.        Sincerely,        Hector Cloud DO

## 2021-03-09 ENCOUNTER — THERAPY VISIT (OUTPATIENT)
Dept: PHYSICAL THERAPY | Facility: CLINIC | Age: 36
End: 2021-03-09
Payer: OTHER MISCELLANEOUS

## 2021-03-09 DIAGNOSIS — M25.511 RIGHT ANTERIOR SHOULDER PAIN: Primary | ICD-10-CM

## 2021-03-09 DIAGNOSIS — M25.511 ACUTE PAIN OF RIGHT SHOULDER: ICD-10-CM

## 2021-03-09 PROCEDURE — 97140 MANUAL THERAPY 1/> REGIONS: CPT | Mod: GP | Performed by: PHYSICAL THERAPIST

## 2021-03-09 PROCEDURE — 97110 THERAPEUTIC EXERCISES: CPT | Mod: GP | Performed by: PHYSICAL THERAPIST

## 2021-03-21 RX ORDER — TRIAMCINOLONE ACETONIDE 40 MG/ML
40 INJECTION, SUSPENSION INTRA-ARTICULAR; INTRAMUSCULAR
Status: DISCONTINUED | OUTPATIENT
Start: 2021-03-02 | End: 2021-03-23

## 2021-03-21 RX ORDER — LIDOCAINE HYDROCHLORIDE 10 MG/ML
2 INJECTION, SOLUTION INFILTRATION; PERINEURAL
Status: DISCONTINUED | OUTPATIENT
Start: 2021-03-02 | End: 2021-03-23

## 2021-03-22 ENCOUNTER — TELEPHONE (OUTPATIENT)
Dept: FAMILY MEDICINE | Facility: CLINIC | Age: 36
End: 2021-03-22

## 2021-03-22 NOTE — TELEPHONE ENCOUNTER
RN received call from patient.    Patient requesting lab results from physical.    RN rceevied labs from 10- with patient.  Patient requested copies be placed at the  or him yo .  RN printed labs and placed at the  as requested    Ed Johnson RN, BSN, PHN  [facility]

## 2021-03-23 ENCOUNTER — THERAPY VISIT (OUTPATIENT)
Dept: PHYSICAL THERAPY | Facility: CLINIC | Age: 36
End: 2021-03-23
Payer: OTHER MISCELLANEOUS

## 2021-03-23 ENCOUNTER — OFFICE VISIT (OUTPATIENT)
Dept: ORTHOPEDICS | Facility: CLINIC | Age: 36
End: 2021-03-23
Payer: OTHER MISCELLANEOUS

## 2021-03-23 VITALS
BODY MASS INDEX: 25.61 KG/M2 | WEIGHT: 150 LBS | SYSTOLIC BLOOD PRESSURE: 116 MMHG | DIASTOLIC BLOOD PRESSURE: 70 MMHG | HEIGHT: 64 IN

## 2021-03-23 DIAGNOSIS — M25.511 RIGHT ANTERIOR SHOULDER PAIN: ICD-10-CM

## 2021-03-23 DIAGNOSIS — S49.91XD INJURY OF RIGHT SHOULDER, SUBSEQUENT ENCOUNTER: Primary | ICD-10-CM

## 2021-03-23 PROCEDURE — 97530 THERAPEUTIC ACTIVITIES: CPT | Mod: GP | Performed by: PHYSICAL THERAPIST

## 2021-03-23 PROCEDURE — 97110 THERAPEUTIC EXERCISES: CPT | Mod: GP | Performed by: PHYSICAL THERAPIST

## 2021-03-23 PROCEDURE — 99213 OFFICE O/P EST LOW 20 MIN: CPT | Performed by: PEDIATRICS

## 2021-03-23 ASSESSMENT — MIFFLIN-ST. JEOR: SCORE: 1511.4

## 2021-03-23 NOTE — PROGRESS NOTES
Subjective:  HPI  Physical Exam                    Objective:  System    Physical Exam    General     ROS    Assessment/Plan:    PROGRESS  REPORT    Progress reporting period is from 3/2/2021 to 3/23/2021.       SUBJECTIVE   The shoulder is feeling great, very strong.  I am still at 15# lifting restriction but I am afraid something else may happen to me.      Current pain level is 2/10  .     Previous pain level was 8/10.   Changes in function:  Yes (See Goal flowsheet attached for changes in current functional level)  Adverse reaction to treatment or activity: None    OBJECTIVE  Changes noted in objective findings:  The objective findings below are from DOS 3-     Objective: Trial of progressive loading well tolerated with the shoulder.  56# 3x12 reps, 46# 3x12, 80# lat pull down 3x12 reps.  24# B biceps curl.      AROM: Shoulder flexion 166 deg, abd 149 slight end range pain with motion.  IR/ext T5.      MMT: Shoulder flexion 5/5, abd 4/5 slight pain, ER 4+/5, IR 5/5.  Progressing well with strength and function.      Reviewed HEP progression.      ASSESSMENT/PLAN  Updated problem list and treatment plan: Diagnosis 1:  R shoulder pain    Pain -  hot/cold therapy, manual therapy, self management, education and home program  Decreased ROM/flexibility - manual therapy and therapeutic exercise  Decreased strength - therapeutic exercise and therapeutic activities  Decreased function - therapeutic activities  Impaired posture - neuro re-education  STG/LTGs have been met or progress has been made towards goals:  Yes (See Goal flow sheet completed today.)  Assessment of Progress: The patient's condition is improving.  Self Management Plans:  Patient has been instructed in a home treatment program.  Patient  has been instructed in self management of symptoms.  I have re-evaluated this patient and find that the nature, scope, duration and intensity of the therapy is appropriate for the medical condition of the  patient.  Ric continues to require the following intervention to meet STG and LTG's:  PT    Recommendations:  This patient would benefit from continued therapy.     Frequency:  1 X a month, once daily  Duration:  for 1 months to continue with HEP finalization post MD appt.         Please refer to the daily flowsheet for treatment today, total treatment time and time spent performing 1:1 timed codes.

## 2021-03-23 NOTE — LETTER
[facility] [place]  [address]  [place] [address]  Phone: [phone]  Fax: [phone]      March 23, 2021      RE: Ric Izaguirre  [address]  [place] [address]        To whom it may concern:    Ric Izaguirre was seen in clinic today. The employee is ABLE to return to work next scheduled work date.    When the patient returns to work, the following restrictions apply:  Lift/carry/push/pull: up to 50 lbs  For weight greater than 50 lbs, get assistance.    Finish physical therapy.  Restrictions in place 2 months.      Sincerely,            Hector Cloud DO, CAQ

## 2021-03-23 NOTE — TELEPHONE ENCOUNTER
Patient picked up envelope. Verified ID, book signed, gave envelope.     .Martina Torres  Patient Representative

## 2021-03-23 NOTE — LETTER
3/23/2021         RE: Ric Izaguirre  [address]  [place] [address]        Dear Colleague,    Thank you for referring your patient, Ric Izaguirre, to the [facility] [place]. Please see a copy of my visit note below.    Sports Medicine Clinic Visit      Assessment:  1. Injury of right shoulder, subsequent encounter        Plan:  Discussed the assessment with the patient.  MRI had demonstrated cuff tendinosis.  Much improvement with subacromial steroid injection.  He feels ready to increase work activities.  Letter provided today, advancing work activities.  Complete physical therapy course; he has 1 visit remaining.  He plans to complete this after his upcoming trip to [facility].  Follow up: Approximately 2 months, or around the time of completing physical therapy course after his upcoming trip, sooner if needed.  Questions answered. Discussed signs and symptoms that may indicate more serious issues; the patient was instructed to seek appropriate care if noted. Michael indicates understanding of these issues and agrees with the plan.          Hectro Cloud,   [facility] [place]      ==========================================      PCP: Yesenia Silva    Ric Izaguirre is a 36 year old male who is seen in f/[facility] for Injury of right shoulder, subsequent encounter. Since last visit on 3/2/2021 patient has had improvement in his shoulder. Does feel he had good relief from the injection and has been improving with PT.  He has been working within his restrictions and does feel he could advance them.    **  Doing HEP, not really having pain.   Injection quite helpful.  Notes at times certain motions/posture may trigger some discomfort.      **  Trip to [facility] leaves 3/28. Returns May 5, 2021.     Seen with [facility] today.      Review of Systems  All other systems reviewed and are negative unless noted above.    Past Medical History:    Diagnosis Date     Abscess     left lower lip     NO ACTIVE PROBLEMS      Past Surgical History:   Procedure Laterality Date     NO HISTORY OF SURGERY       Family History   Problem Relation Age of Onset     Diabetes No family hx of      Coronary Artery Disease No family hx of      Hypertension No family hx of      Hyperlipidemia No family hx of      Cerebrovascular Disease No family hx of      Breast Cancer No family hx of      Colon Cancer No family hx of      Prostate Cancer No family hx of      Social History     Socioeconomic History     Marital status:      Spouse name: Not on file     Number of children: Not on file     Years of education: Not on file     Highest education level: Not on file   Occupational History     Not on file   Social Needs     Financial resource strain: Not on file     Food insecurity     Worry: Not on file     Inability: Not on file     Transportation needs     Medical: Not on file     Non-medical: Not on file   Tobacco Use     Smoking status: Never Smoker     Smokeless tobacco: Never Used   Substance and Sexual Activity     Alcohol use: No     Drug use: No     Sexual activity: Yes     Partners: Female   Lifestyle     Physical activity     Days per week: Not on file     Minutes per session: Not on file     Stress: Not on file   Relationships     Social connections     Talks on phone: Not on file     Gets together: Not on file     Attends Rastafarian service: Not on file     Active member of club or organization: Not on file     Attends meetings of clubs or organizations: Not on file     Relationship status: Not on file     Intimate partner violence     Fear of current or ex partner: Not on file     Emotionally abused: Not on file     Physically abused: Not on file     Forced sexual activity: Not on file   Other Topics Concern     Parent/sibling w/ CABG, MI or angioplasty before 65F 55M? No   Social History Narrative     Not on file         Objective  /70   Ht 1.626 m (5'  "4\")   Wt 68 kg (150 lb)   BMI 25.75 kg/m      GENERAL APPEARANCE: healthy, alert and no distress   GAIT: NORMAL  SKIN: no suspicious lesions or rashes  NEURO: Normal strength and tone, mentation intact and speech normal  PSYCH:  mentation appears normal and affect normal/bright  HEENT: no scleral icterus  CV: distal perfusion intact  RESP: nonlabored breathing      Exam  Right Shoulder exam    ROM:      Full active and passive ROM with flexion, extension, abduction, internal and external rotation.  No pain          Radiology  Previous MRI:    EXAM: MR Right shoulder without  contrast 2/27/2021 10:24 AM     TECHNIQUE: Multiplanar, multisequence imaging of the right shoulder  were obtained without administration of intravenous or intra-articular  gadolinium contrast using routine protocol.     History: Shoulder pain, rotator cuff disorder suspected, xray done;  Injury of right shoulder, initial encounter      Comparison: Radiographs 2/23/2021     Findings:     ROTATOR CUFF and ASSOCIATED STRUCTURES  Rotator cuff: Supraspinatus tendinosis/strain without tear. Low to  moderate grade bursal sided fraying of the mid infraspinatus at the  footprint. No full-thickness tear. Teres minor and subscapularis are  intact.     Bursa: No subacromial or subdeltoid bursal fluid.     Musculature: Muscle bulk of rotator cuff is preserved.  Deltoid muscle  bulk is also preserved.  No muscle edema.     Acromioclavicular joint  There are mild degenerative changes of the acromioclavicular joint.  Acromion is type 2 in sagittal morphology.  Coracoacromial ligament is  not thickened.     OSSEOUS STRUCTURES  Focal bone marrow edema within the lateral humeral head greater  tuberosity  Insertion sites of the posterior supraspinatus-anterior infraspinatus.  No fracture.     LONG BICIPITAL TENDON  The long head of the biceps tendon is normally situated within the  bicipital groove. No complete or partial biceps tendon tear " is  present.     GLENOHUMERAL JOINT  Joint fluid: Physiologic amount of joint fluid is  present.     Cartilage and subarticular bone:  No focal hyaline cartilage defects  are noted. No Hill-Sachs, reverse Hill-Sachs, or bony Bankart lesions  are seen.     Labrum: Limited assessment on this study with relative lack of joint  distention shows no labral tear.     ANCILLARY FINDINGS:  None                                                                      Impression:     1. Low to moderate grade bursal sided fraying of the mid infraspinatus  at the footprint.     2. Supraspinatus tendinosis without tear.     3. Focal edema within the greater tuberosity at the insertion site of  the posterior supraspinatus-anterior infraspinatus. No fracture. Bone  marrow edema may be related to contusion or enthesopathic change.     CELSO BERRY MD ([place])        Note: Time spent in one-on-one evaluation and discussion with the patient regarding the nature of problem, course, prior treatments, and therapeutic options, along with review of medical record (including outside sources/documentation), and completing documentation: 26 minutes.      This note consists of symbols derived from keyboarding, dictation and/or voice recognition software. As a result, there may be errors in the script that have gone undetected. Please consider this when interpreting information found in this chart.          Again, thank you for allowing me to participate in the care of your patient.        Sincerely,        Hector Cloud, DO

## 2021-03-23 NOTE — PROGRESS NOTES
Sports Medicine Clinic Visit      Assessment:  1. Injury of right shoulder, subsequent encounter        Plan:  Discussed the assessment with the patient.  MRI had demonstrated cuff tendinosis.  Much improvement with subacromial steroid injection.  He feels ready to increase work activities.  Letter provided today, advancing work activities.  Complete physical therapy course; he has 1 visit remaining.  He plans to complete this after his upcoming trip to [facility].  Follow up: Approximately 2 months, or around the time of completing physical therapy course after his upcoming trip, sooner if needed.  Questions answered. Discussed signs and symptoms that may indicate more serious issues; the patient was instructed to seek appropriate care if noted. Michael indicates understanding of these issues and agrees with the plan.          Hector Cloud DO  [facility] [place]      ==========================================      PCP: Yesenia Silva Gustaovkameron is a 36 year old male who is seen in f/u up for Injury of right shoulder, subsequent encounter. Since last visit on 3/2/2021 patient has had improvement in his shoulder. Does feel he had good relief from the injection and has been improving with PT.  He has been working within his restrictions and does feel he could advance them.    **  Doing HEP, not really having pain.   Injection quite helpful.  Notes at times certain motions/posture may trigger some discomfort.      **  Trip to [facility] leaves 3/28. Returns May 5, 2021.     Seen with [facility] today.      Review of Systems  All other systems reviewed and are negative unless noted above.    Past Medical History:   Diagnosis Date     Abscess     left lower lip     NO ACTIVE PROBLEMS      Past Surgical History:   Procedure Laterality Date     NO HISTORY OF SURGERY       Family History   Problem Relation Age of Onset     Diabetes No family hx of      Coronary Artery Disease No  "family hx of      Hypertension No family hx of      Hyperlipidemia No family hx of      Cerebrovascular Disease No family hx of      Breast Cancer No family hx of      Colon Cancer No family hx of      Prostate Cancer No family hx of      Social History     Socioeconomic History     Marital status:      Spouse name: Not on file     Number of children: Not on file     Years of education: Not on file     Highest education level: Not on file   Occupational History     Not on file   Social Needs     Financial resource strain: Not on file     Food insecurity     Worry: Not on file     Inability: Not on file     Transportation needs     Medical: Not on file     Non-medical: Not on file   Tobacco Use     Smoking status: Never Smoker     Smokeless tobacco: Never Used   Substance and Sexual Activity     Alcohol use: No     Drug use: No     Sexual activity: Yes     Partners: Female   Lifestyle     Physical activity     Days per week: Not on file     Minutes per session: Not on file     Stress: Not on file   Relationships     Social connections     Talks on phone: Not on file     Gets together: Not on file     Attends Jewish service: Not on file     Active member of club or organization: Not on file     Attends meetings of clubs or organizations: Not on file     Relationship status: Not on file     Intimate partner violence     Fear of current or ex partner: Not on file     Emotionally abused: Not on file     Physically abused: Not on file     Forced sexual activity: Not on file   Other Topics Concern     Parent/sibling w/ CABG, MI or angioplasty before 65F 55M? No   Social History Narrative     Not on file         Objective  /70   Ht 1.626 m (5' 4\")   Wt 68 kg (150 lb)   BMI 25.75 kg/m      GENERAL APPEARANCE: healthy, alert and no distress   GAIT: NORMAL  SKIN: no suspicious lesions or rashes  NEURO: Normal strength and tone, mentation intact and speech normal  PSYCH:  mentation appears normal and affect " normal/bright  HEENT: no scleral icterus  CV: distal perfusion intact  RESP: nonlabored breathing      Exam  Right Shoulder exam    ROM:      Full active and passive ROM with flexion, extension, abduction, internal and external rotation.  No pain          Radiology  Previous MRI:    EXAM: MR Right shoulder without  contrast 2/27/2021 10:24 AM     TECHNIQUE: Multiplanar, multisequence imaging of the right shoulder  were obtained without administration of intravenous or intra-articular  gadolinium contrast using routine protocol.     History: Shoulder pain, rotator cuff disorder suspected, xray done;  Injury of right shoulder, initial encounter      Comparison: Radiographs 2/23/2021     Findings:     ROTATOR CUFF and ASSOCIATED STRUCTURES  Rotator cuff: Supraspinatus tendinosis/strain without tear. Low to  moderate grade bursal sided fraying of the mid infraspinatus at the  footprint. No full-thickness tear. Teres minor and subscapularis are  intact.     Bursa: No subacromial or subdeltoid bursal fluid.     Musculature: Muscle bulk of rotator cuff is preserved.  Deltoid muscle  bulk is also preserved.  No muscle edema.     Acromioclavicular joint  There are mild degenerative changes of the acromioclavicular joint.  Acromion is type 2 in sagittal morphology.  Coracoacromial ligament is  not thickened.     OSSEOUS STRUCTURES  Focal bone marrow edema within the lateral humeral head greater  tuberosity  Insertion sites of the posterior supraspinatus-anterior infraspinatus.  No fracture.     LONG BICIPITAL TENDON  The long head of the biceps tendon is normally situated within the  bicipital groove. No complete or partial biceps tendon tear is  present.     GLENOHUMERAL JOINT  Joint fluid: Physiologic amount of joint fluid is  present.     Cartilage and subarticular bone:  No focal hyaline cartilage defects  are noted. No Hill-Sachs, reverse Hill-Sachs, or bony Bankart lesions  are seen.     Labrum: Limited assessment on  this study with relative lack of joint  distention shows no labral tear.     ANCILLARY FINDINGS:  None                                                                      Impression:     1. Low to moderate grade bursal sided fraying of the mid infraspinatus  at the footprint.     2. Supraspinatus tendinosis without tear.     3. Focal edema within the greater tuberosity at the insertion site of  the posterior supraspinatus-anterior infraspinatus. No fracture. Bone  marrow edema may be related to contusion or enthesopathic change.     CELSO BERRY MD ([place])        Note: Time spent in one-on-one evaluation and discussion with the patient regarding the nature of problem, course, prior treatments, and therapeutic options, along with review of medical record (including outside sources/documentation), and completing documentation: 26 minutes.      This note consists of symbols derived from keyboarding, dictation and/or voice recognition software. As a result, there may be errors in the script that have gone undetected. Please consider this when interpreting information found in this chart.

## 2021-03-29 ENCOUNTER — TELEPHONE (OUTPATIENT)
Dept: FAMILY MEDICINE | Facility: CLINIC | Age: 36
End: 2021-03-29

## 2021-03-29 NOTE — TELEPHONE ENCOUNTER
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  Forms placed in provider 'sign me' folder.  Please fax forms to [phone] after completion.    SANGITA ZHENG (R)  Radiology Department

## 2021-04-01 NOTE — TELEPHONE ENCOUNTER
Per Dr. Silva: Clearance form should be done by sports medicine Provider or last Provider who saw the patient.  Spoke to [employer] representative. Notified of Dr. Silva's' message.  No further action required at this time.   Mickie Kessler CMA ([facility])

## 2021-04-06 ENCOUNTER — TELEPHONE (OUTPATIENT)
Dept: FAMILY MEDICINE | Facility: CLINIC | Age: 36
End: 2021-04-06

## 2021-04-06 NOTE — TELEPHONE ENCOUNTER
Reason for call:  Other   Patient called regarding (reason for call): form  Additional comments: "[employer]    He said the form where sent back not signed. He was frustrated that this happened because now he has to start the process over.    He reported that his wife also tried contacting the clinic    I attempted to contact the site TC's. At [place]. Patient hung up before I was able to transfer him to someone directly.      Phone number to reach patient:  Patient said he was calling on an international line.        Best Time:  I'm not sure if his home number will work to reach the patient about this.    Can we leave a detailed message on this number?  Not Applicable    Travel screening: Not Applicable

## 2021-05-13 LAB
SARS-COV-2 PCR COMMENT: NORMAL
SARS-COV-2 RNA SPEC QL NAA+PROBE: NEGATIVE
SARS-COV-2 VIRUS SPECIMEN SOURCE: NORMAL

## 2021-05-17 ENCOUNTER — RECORDS - HEALTHEAST (OUTPATIENT)
Dept: LAB | Facility: CLINIC | Age: 36
End: 2021-05-17

## 2021-05-25 ENCOUNTER — OFFICE VISIT (OUTPATIENT)
Dept: ORTHOPEDICS | Facility: CLINIC | Age: 36
End: 2021-05-25
Payer: OTHER MISCELLANEOUS

## 2021-05-25 VITALS
BODY MASS INDEX: 25.61 KG/M2 | WEIGHT: 150 LBS | DIASTOLIC BLOOD PRESSURE: 64 MMHG | HEIGHT: 64 IN | SYSTOLIC BLOOD PRESSURE: 106 MMHG

## 2021-05-25 DIAGNOSIS — S49.91XD INJURY OF RIGHT SHOULDER, SUBSEQUENT ENCOUNTER: Primary | ICD-10-CM

## 2021-05-25 PROCEDURE — 99213 OFFICE O/P EST LOW 20 MIN: CPT | Performed by: PEDIATRICS

## 2021-05-25 ASSESSMENT — MIFFLIN-ST. JEOR: SCORE: 1511.4

## 2021-05-25 NOTE — LETTER
[facility] [place]  [address]  [address]  [place] [address]  Phone: [phone]  Fax: [phone]      May 25, 2021      RE: Ric Izaguirre  [address]  [place] [address]        To whom it may concern:    Ric Izaguirre was seen in clinic today. The employee is ABLE to return to work next scheduled work date.    When the patient returns to work, there are no formal work restrictions.  Please allow him to take breaks as needed during the day, to rest the shoulder, over the next 4 weeks.        Sincerely,            Hector Cloud, , CAQ

## 2021-05-25 NOTE — PROGRESS NOTES
Sports Medicine Clinic Visit      Assessment:  1. Injury of right shoulder, subsequent encounter        Plan:  Discussed the assessment with the patient and [facility].  Symptoms much improved.  With return to more physical work, he has some lack of full strength yet.  He will continue to work on this with PT exercises.  Otherwise, overall doing well.  Updated letter today, no formal restrictions.  May benefit from occasional breaks throughout the day.  Follow up: Will leave as needed.  Questions answered. Discussed signs and symptoms that may indicate more serious issues; the patient was instructed to seek appropriate care if noted. Michael indicates understanding of these issues and agrees with the plan.          Hector Cloud DO  [facility] [place]      ==========================================      PCP: Yesenia Silva    Ric Izaguirre is a 36 year old male who is seen in f/u up for Injury of right shoulder, subsequent encounter. Since last visit on 3/23/2021 patient has had improvement in his shoulder.  He was away for 1 month, when he returned he did resume work with no restrictions.    **  No longer having pain, but still feeling weak at times.  Feels like has returned fully to work with lifting, though taking some breaks.      Review of Systems  All other systems reviewed and are negative unless noted above.    Past Medical History:   Diagnosis Date     Abscess     left lower lip     NO ACTIVE PROBLEMS      Past Surgical History:   Procedure Laterality Date     NO HISTORY OF SURGERY       Family History   Problem Relation Age of Onset     Diabetes No family hx of      Coronary Artery Disease No family hx of      Hypertension No family hx of      Hyperlipidemia No family hx of      Cerebrovascular Disease No family hx of      Breast Cancer No family hx of      Colon Cancer No family hx of      Prostate Cancer No family hx of      Social History     Socioeconomic  "History     Marital status:      Spouse name: Not on file     Number of children: Not on file     Years of education: Not on file     Highest education level: Not on file   Occupational History     Not on file   Social Needs     Financial resource strain: Not on file     Food insecurity     Worry: Not on file     Inability: Not on file     Transportation needs     Medical: Not on file     Non-medical: Not on file   Tobacco Use     Smoking status: Never Smoker     Smokeless tobacco: Never Used   Substance and Sexual Activity     Alcohol use: No     Drug use: No     Sexual activity: Yes     Partners: Female   Lifestyle     Physical activity     Days per week: Not on file     Minutes per session: Not on file     Stress: Not on file   Relationships     Social connections     Talks on phone: Not on file     Gets together: Not on file     Attends Yazidi service: Not on file     Active member of club or organization: Not on file     Attends meetings of clubs or organizations: Not on file     Relationship status: Not on file     Intimate partner violence     Fear of current or ex partner: Not on file     Emotionally abused: Not on file     Physically abused: Not on file     Forced sexual activity: Not on file   Other Topics Concern     Parent/sibling w/ CABG, MI or angioplasty before 65F 55M? No   Social History Narrative     Not on file         Objective  /64   Ht 1.626 m (5' 4\")   Wt 68 kg (150 lb)   BMI 25.75 kg/m      GENERAL APPEARANCE: healthy, alert and no distress   GAIT: NORMAL  SKIN: no suspicious lesions or rashes  NEURO: Normal strength and tone, mentation intact and speech normal  PSYCH:  mentation appears normal and affect normal/bright  HEENT: no scleral icterus  CV: distal perfusion intact  RESP: nonlabored breathing      Exam  No pain with active motion right shoulder.      Radiology  See previous notes.            This note consists of symbols derived from keyboarding, dictation and/or " voice recognition software. As a result, there may be errors in the script that have gone undetected. Please consider this when interpreting information found in this chart.

## 2021-05-25 NOTE — LETTER
5/25/2021         RE: Ric Izaguirre  [address]  [place] [address]        Dear Colleague,    Thank you for referring your patient, Ric Izaguirre, to the [facility] [place]. Please see a copy of my visit note below.    Sports Medicine Clinic Visit      Assessment:  1. Injury of right shoulder, subsequent encounter        Plan:  Discussed the assessment with the patient and [facility].  Symptoms much improved.  With return to more physical work, he has some lack of full strength yet.  He will continue to work on this with PT exercises.  Otherwise, overall doing well.  Updated letter today, no formal restrictions.  May benefit from occasional breaks throughout the day.  Follow up: Will leave as needed.  Questions answered. Discussed signs and symptoms that may indicate more serious issues; the patient was instructed to seek appropriate care if noted. Michael indicates understanding of these issues and agrees with the plan.          Hector Cloud,   [facility] [place]      ==========================================      PCP: Yesenia Silva    Ric Izaguirre is a 36 year old male who is seen in f/u up for Injury of right shoulder, subsequent encounter. Since last visit on 3/23/2021 patient has had improvement in his shoulder.  He was away for 1 month, when he returned he did resume work with no restrictions.    **  No longer having pain, but still feeling weak at times.  Feels like has returned fully to work with lifting, though taking some breaks.      Review of Systems  All other systems reviewed and are negative unless noted above.    Past Medical History:   Diagnosis Date     Abscess     left lower lip     NO ACTIVE PROBLEMS      Past Surgical History:   Procedure Laterality Date     NO HISTORY OF SURGERY       Family History   Problem Relation Age of Onset     Diabetes No family hx of      Coronary Artery Disease No family  "hx of      Hypertension No family hx of      Hyperlipidemia No family hx of      Cerebrovascular Disease No family hx of      Breast Cancer No family hx of      Colon Cancer No family hx of      Prostate Cancer No family hx of      Social History     Socioeconomic History     Marital status:      Spouse name: Not on file     Number of children: Not on file     Years of education: Not on file     Highest education level: Not on file   Occupational History     Not on file   Social Needs     Financial resource strain: Not on file     Food insecurity     Worry: Not on file     Inability: Not on file     Transportation needs     Medical: Not on file     Non-medical: Not on file   Tobacco Use     Smoking status: Never Smoker     Smokeless tobacco: Never Used   Substance and Sexual Activity     Alcohol use: No     Drug use: No     Sexual activity: Yes     Partners: Female   Lifestyle     Physical activity     Days per week: Not on file     Minutes per session: Not on file     Stress: Not on file   Relationships     Social connections     Talks on phone: Not on file     Gets together: Not on file     Attends Mandaeism service: Not on file     Active member of club or organization: Not on file     Attends meetings of clubs or organizations: Not on file     Relationship status: Not on file     Intimate partner violence     Fear of current or ex partner: Not on file     Emotionally abused: Not on file     Physically abused: Not on file     Forced sexual activity: Not on file   Other Topics Concern     Parent/sibling w/ CABG, MI or angioplasty before 65F 55M? No   Social History Narrative     Not on file         Objective  /64   Ht 1.626 m (5' 4\")   Wt 68 kg (150 lb)   BMI 25.75 kg/m      GENERAL APPEARANCE: healthy, alert and no distress   GAIT: NORMAL  SKIN: no suspicious lesions or rashes  NEURO: Normal strength and tone, mentation intact and speech normal  PSYCH:  mentation appears normal and affect " normal/bright  HEENT: no scleral icterus  CV: distal perfusion intact  RESP: nonlabored breathing      Exam  No pain with active motion right shoulder.      Radiology  See previous notes.            This note consists of symbols derived from keyboarding, dictation and/or voice recognition software. As a result, there may be errors in the script that have gone undetected. Please consider this when interpreting information found in this chart.          Again, thank you for allowing me to participate in the care of your patient.        Sincerely,        Hector Cloud DO

## 2021-06-09 PROBLEM — M25.511 RIGHT ANTERIOR SHOULDER PAIN: Status: RESOLVED | Noted: 2021-01-05 | Resolved: 2021-06-09

## 2021-06-10 ENCOUNTER — OFFICE VISIT (OUTPATIENT)
Dept: ORTHOPEDICS | Facility: CLINIC | Age: 36
End: 2021-06-10
Payer: OTHER MISCELLANEOUS

## 2021-06-10 VITALS
DIASTOLIC BLOOD PRESSURE: 75 MMHG | HEIGHT: 64 IN | SYSTOLIC BLOOD PRESSURE: 108 MMHG | WEIGHT: 150 LBS | BODY MASS INDEX: 25.61 KG/M2

## 2021-06-10 DIAGNOSIS — S49.91XD INJURY OF RIGHT SHOULDER, SUBSEQUENT ENCOUNTER: Primary | ICD-10-CM

## 2021-06-10 PROCEDURE — 99213 OFFICE O/P EST LOW 20 MIN: CPT | Performed by: PEDIATRICS

## 2021-06-10 ASSESSMENT — MIFFLIN-ST. JEOR: SCORE: 1511.4

## 2021-06-10 NOTE — PROGRESS NOTES
ASSESSMENT & PLAN    Ric was seen today for pain, follow up and recheck.    Diagnoses and all orders for this visit:    Injury of right shoulder, subsequent encounter  -     [facility]; Future      This issue is chronic and Worsening.  Given aggravation will return to PT, some work restrictions.  Close follow up in 6 weeks, would consider referral or repeat injection pending clinical course.    Plan:  - Today's Plan of Care:  Rehab: Physical Therapy: [facility] - [phone]  Work Restrictions  Ice  OTC medications as needed  Start with light ROM exercises, pendulum, wall crawls    -We also discussed other future treatment options:  Consideration of repeat injection  Referral to Orthopedic Surgery    Follow Up: 6 weeks    Concerning signs and symptoms were reviewed.  The patient expressed understanding of this management plan and all questions were answered at this time.    Mayuri Leal MD [facility]  Sports Medicine Physician  [facility]      SUBJECTIVE- Interim History Melissa 10, 2021    Chief Complaint   Patient presents with     Right Shoulder - Pain, Follow Up, RECHECK       Ric Izaguirre is a 36 year old male who is seen in f/u up for Injury of right shoulder, subsequent encounter. Since last visit on 5/25/21, with Dr. Cloud patient was given less restrictions at work and now he is re-aggravated. Also traveled to [place], since returning has been doing his regular job for ~ 1 month.  ~ 12/19/20 initial injury date  Note: needs new workability    Worsened by: moving the shoulder, steering wheel   Better with: physical therapy, massage  Treatments tried: rest/activity avoidance, ice, other medications: topicals and physical therapy   - Injection with Dr. Cloud: 3/2/2021  Associated symptoms:  locking or catching and pain (sharp with movement)    The patient is seen with a QRC (Qualified Rehabilitation Consultant).  The patient is Right  "handed    Orthopedic/Surgical history: NO  Social History/Occupation: nursing asssistant    No family history pertinent to patient's problem today.    REVIEW OF SYSTEMS:  Review of Systems  Skin: no bruising, no swelling  Musculoskeletal: as above  Neurologic: no numbness, paresthesias  Remainder of review of systems is negative including constitutional, CV, pulmonary, GI, except as noted in HPI or medical history.    OBJECTIVE:  /75   Ht 1.626 m (5' 4\")   Wt 68 kg (150 lb)   BMI 25.75 kg/m       GENERAL APPEARANCE: healthy, alert and no distress   GAIT: NORMAL  SKIN: no suspicious lesions or rashes  HEENT: Sclera clear, anicteric  CV: good peripheral pulses  RESP: Breathing not labored  NEURO: Normal strength and tone, mentation intact and speech normal  PSYCH:  mentation appears normal and affect normal/bright    RADIOLOGY:  Final results and radiologist's interpretation, available in the [facility] health record.  Images were reviewed with the patient in the office today.  My personal interpretation of the performed imaging:  MR Right Shoulder 2/27/2021 - bursal sided fraying infraspinatus, supraspinatus tendinosis    Review of the result(s) of each unique test - MRI       "

## 2021-06-10 NOTE — LETTER
6/10/2021         RE: Ric Izaguirre  [address]  [place] [address]        Dear Colleague,    Thank you for referring your patient, Ric Izaguirre, to the [facility] [place]. Please see a copy of my visit note below.    ASSESSMENT & PLAN    Ric was seen today for pain, follow up and recheck.    Diagnoses and all orders for this visit:    Injury of right shoulder, subsequent encounter  -     [facility]; Future      This issue is chronic and Worsening.  Given aggravation will return to PT, some work restrictions.  Close follow up in 6 weeks, would consider referral or repeat injection pending clinical course.    Plan:  - Today's Plan of Care:  Rehab: Physical Therapy: [facility] - [phone]  Work Restrictions  Ice  OTC medications as needed  Start with light ROM exercises, pendulum, wall crawls    -We also discussed other future treatment options:  Consideration of repeat injection  Referral to Orthopedic Surgery    Follow Up: 6 weeks    Concerning signs and symptoms were reviewed.  The patient expressed understanding of this management plan and all questions were answered at this time.    Mayuri Leal MD [facility]  Sports Medicine Physician  [facility]      SUBJECTIVE- Interim History Melissa 10, 2021    Chief Complaint   Patient presents with     Right Shoulder - Pain, Follow Up, RECHECK       Ric Izaguirre is a 36 year old male who is seen in f/u up for Injury of right shoulder, subsequent encounter. Since last visit on 5/25/21, with Dr. Cloud patient was given less restrictions at work and now he is re-aggravated. Also traveled to [place], since returning has been doing his regular job for ~ 1 month.  ~ 12/19/20 initial injury date  Note: needs new workability    Worsened by: moving the shoulder, steering wheel   Better with: physical therapy, massage  Treatments tried: rest/activity avoidance,  "ice, other medications: topicals and physical therapy   - Injection with Dr. Cloud: 3/2/2021  Associated symptoms:  locking or catching and pain (sharp with movement)    The patient is seen with a QRC (Qualified Rehabilitation Consultant).  The patient is Right handed    Orthopedic/Surgical history: NO  Social History/Occupation: nursing asssistant    No family history pertinent to patient's problem today.    REVIEW OF SYSTEMS:  Review of Systems  Skin: no bruising, no swelling  Musculoskeletal: as above  Neurologic: no numbness, paresthesias  Remainder of review of systems is negative including constitutional, CV, pulmonary, GI, except as noted in HPI or medical history.    OBJECTIVE:  /75   Ht 1.626 m (5' 4\")   Wt 68 kg (150 lb)   BMI 25.75 kg/m       GENERAL APPEARANCE: healthy, alert and no distress   GAIT: NORMAL  SKIN: no suspicious lesions or rashes  HEENT: Sclera clear, anicteric  CV: good peripheral pulses  RESP: Breathing not labored  NEURO: Normal strength and tone, mentation intact and speech normal  PSYCH:  mentation appears normal and affect normal/bright    RADIOLOGY:  Final results and radiologist's interpretation, available in the [facility] health record.  Images were reviewed with the patient in the office today.  My personal interpretation of the performed imaging:  MR Right Shoulder 2/27/2021 - bursal sided fraying infraspinatus, supraspinatus tendinosis    Review of the result(s) of each unique test - MRI           Again, thank you for allowing me to participate in the care of your patient.        Sincerely,        Mayuri Leal MD    "

## 2021-06-10 NOTE — LETTER
[facility] [place]  [address]  [place] [address]  Phone: [phone]  Fax: [phone]      REPORT OF WORK ABILITY    NOTE TO EMPLOYEE: You must promptly provide a copy of this report to your  employer or worker's compensation insurer, and Qualified Rehabilitation Consultant.    Date: 6/10/2021                     Employee Name: Ric Izaguirre         YOB: 1985  Medical Record Number: [id number]   Soc.Sec.No: [id number]  Employer: None                Date of Injury: 12/19/20  Managed Care Organization / Insurance Company Name: UNKNOWN    Diagnosis: Right Shoulder injury  Work Related: yes     MMI: NO   Permanent Partial Disability(PPD) likely: UNKNOWN    EMPLOYEE IS ABLE TO WORK: Return to work with restrictions on next available     RESTRICTIONS IF ANY:     Shoulder/Elbow:  right Avoid outstretched arms  Avoid repetitive motion  Avoid overhead work    OTHER RESTRICTIONS: None    TREATMENT PLAN/NOTES: Start physical therapy and follow up in 6 weeks.      Mayuri Leal MD

## 2021-06-10 NOTE — PATIENT INSTRUCTIONS
Plan:  - Today's Plan of Care:  Rehab: Physical Therapy: [facility] - [phone]  Work Restrictions  Ice  OTC medications as needed  Start with light ROM exercises, pendulum, wall crawls    -We also discussed other future treatment options:  Consideration of repeat injection  Referral to Orthopedic Surgery    Follow Up: 6 weeks    If you have any further questions for your physician or physician s care team you can call [phone] and use option 3 to leave a voice message. Calls received during business hours will be returned same day.

## 2021-06-21 ENCOUNTER — TELEPHONE (OUTPATIENT)
Dept: ORTHOPEDICS | Facility: CLINIC | Age: 36
End: 2021-06-21

## 2021-06-21 NOTE — TELEPHONE ENCOUNTER
Reason for Call:  Form, our goal is to have forms completed with 7 days, however, some forms may require a visit or additional information.    Type of letter, form or note:  MMI     Who is the form from?: Insurance    Where did the form come from: form was faxed in    Phone number of person requesting form: [phone]  Can we leave a detailed message on this number:  Not Applicable    Desired completion date of form: ASAP      How will form be returned?:  fax to [phone]    Has the patient signed a consent form for release of information (may be included with form)? Not Applicable    Additional comments: Forms completed and faxed to [facility]     Danitza Baig ATC

## 2021-06-24 ENCOUNTER — TELEPHONE (OUTPATIENT)
Dept: PHYSICAL THERAPY | Facility: CLINIC | Age: 36
End: 2021-06-24

## 2021-06-24 ENCOUNTER — THERAPY VISIT (OUTPATIENT)
Dept: PHYSICAL THERAPY | Facility: CLINIC | Age: 36
End: 2021-06-24
Attending: PEDIATRICS
Payer: OTHER MISCELLANEOUS

## 2021-06-24 DIAGNOSIS — M25.511 RIGHT ANTERIOR SHOULDER PAIN: Primary | ICD-10-CM

## 2021-06-24 DIAGNOSIS — M25.511 ACUTE PAIN OF RIGHT SHOULDER: ICD-10-CM

## 2021-06-24 DIAGNOSIS — S49.91XD INJURY OF RIGHT SHOULDER, SUBSEQUENT ENCOUNTER: Primary | ICD-10-CM

## 2021-06-24 PROCEDURE — 97140 MANUAL THERAPY 1/> REGIONS: CPT | Mod: GP | Performed by: PHYSICAL THERAPIST

## 2021-06-24 PROCEDURE — 97110 THERAPEUTIC EXERCISES: CPT | Mod: GP | Performed by: PHYSICAL THERAPIST

## 2021-06-24 NOTE — PROGRESS NOTES
Subjective:  HPI  Physical Exam                    Objective:  System                   Shoulder Evaluation:  ROM:  AROM:    Flexion:  Left:  157    Right:  145    Abduction:  Right:  110            Elbow Flexion:  Right:  WNL  Elbow Extension:  Right:  WNL    Extension/Internal Rotation:  Right:  T7 painful           Strength:    Flexion: Left:5/5   Pain:    Right: 3+/5  Weak/painful     Pain:     Abduction:  Left: 5/5  Pain:    Right: 4/5     Pain:    Internal Rotation:  Left:5/5     Pain:    Right: 5-/5     Pain:  External Rotation:   Left:5/5     Pain:   Right:3+/5   Weak/painful    Pain:                                                     General     ROS    Assessment/Plan:    PROGRESS  REPORT    Progress reporting period is from 3/23/2021 to 6/24/2021.       SUBJECTIVE  The shoulder was doing well after the injection and when I went to [facility].  When I was there for a month I had some massage and treatment for pain.  When I returned I went back to work full duty.  After a a couple of days the shoulder started hurting again.  Pain 5/10 through the lateral arm.  If I touch it I feel the pain.  Pain also through the left side of the neck.  This pain is new and feels like it just goes down the arm.        Current pain level is 5/10; worst 7/10  Changes in function:  Yes, had improved, recent decline with return to full duty.     Full duty at work, using sling around waist- easy stand.  It depends  Adverse reaction to treatment or activity: activity - work activity producing pain.      OBJECTIVE  Changes noted in objective findings:  The objective findings below are from DOS 6/24/2021.    AROM: cervical WFL.  Did not produce pain.  Repeated extension into spine produced pain.      ASSESSMENT/PLAN  Updated problem list and treatment plan: Diagnosis 1:  R shoulder pain    Pain -  hot/cold therapy, US, manual therapy, self management, education and home program  Decreased ROM/flexibility - manual therapy and  therapeutic exercise  Decreased strength - therapeutic exercise and therapeutic activities  Impaired muscle performance - neuro re-education  Decreased function - therapeutic activities  Impaired posture - neuro re-education  STG/LTGs have been met or progress has been made towards goals:  Yes (See Goal flow sheet completed today.)  Assessment of Progress: The patient's condition is improving.  Self Management Plans:  Patient has been instructed in a home treatment program.  Patient  has been instructed in self management of symptoms.  I have re-evaluated this patient and find that the nature, scope, duration and intensity of the therapy is appropriate for the medical condition of the patient.  Ric continues to require the following intervention to meet STG and LTG's:  PT    Recommendations:  This patient would benefit from continued therapy.     Frequency:  1 X week, once daily  Duration:  for 6 weeks then return to MD for additional care planning       Please refer to the daily flowsheet for treatment today, total treatment time and time spent performing 1:1 timed codes.

## 2021-06-24 NOTE — TELEPHONE ENCOUNTER
+++LAST AUTH'D VISIT 6-+++  WC (R) shoulder / Dr. Mayuri Leal @ [facility] / DOI:   12- / Adj: Denise Tapia @ [facility] [phone] / CL#:   [id number]  1/14: Auth approved 12 visits ~LL    Requested 6 additional visits 6/24/2021

## 2021-07-01 ENCOUNTER — THERAPY VISIT (OUTPATIENT)
Dept: PHYSICAL THERAPY | Facility: CLINIC | Age: 36
End: 2021-07-01
Attending: PEDIATRICS
Payer: OTHER MISCELLANEOUS

## 2021-07-01 DIAGNOSIS — M25.511 RIGHT ANTERIOR SHOULDER PAIN: ICD-10-CM

## 2021-07-01 DIAGNOSIS — S49.91XD INJURY OF RIGHT SHOULDER, SUBSEQUENT ENCOUNTER: Primary | ICD-10-CM

## 2021-07-01 DIAGNOSIS — M25.511 ACUTE PAIN OF RIGHT SHOULDER: ICD-10-CM

## 2021-07-01 PROCEDURE — 97140 MANUAL THERAPY 1/> REGIONS: CPT | Mod: GP | Performed by: PHYSICAL THERAPIST

## 2021-07-01 PROCEDURE — 97110 THERAPEUTIC EXERCISES: CPT | Mod: GP | Performed by: PHYSICAL THERAPIST

## 2021-07-22 ENCOUNTER — OFFICE VISIT (OUTPATIENT)
Dept: ORTHOPEDICS | Facility: CLINIC | Age: 36
End: 2021-07-22
Payer: OTHER MISCELLANEOUS

## 2021-07-22 VITALS
WEIGHT: 150 LBS | HEIGHT: 64 IN | BODY MASS INDEX: 25.61 KG/M2 | SYSTOLIC BLOOD PRESSURE: 121 MMHG | DIASTOLIC BLOOD PRESSURE: 79 MMHG

## 2021-07-22 DIAGNOSIS — S49.91XD INJURY OF RIGHT SHOULDER, SUBSEQUENT ENCOUNTER: Primary | ICD-10-CM

## 2021-07-22 DIAGNOSIS — M75.81 TENDINITIS OF RIGHT ROTATOR CUFF: ICD-10-CM

## 2021-07-22 PROCEDURE — 99213 OFFICE O/P EST LOW 20 MIN: CPT | Performed by: PEDIATRICS

## 2021-07-22 ASSESSMENT — MIFFLIN-ST. JEOR: SCORE: 1511.4

## 2021-07-22 NOTE — PATIENT INSTRUCTIONS
Plan:  - Today's Plan of Care:  Continue Physical Therapy  Work letter updated    -We also discussed other future treatment options:  Consideration of repeat injection  Referral to Orthopedic Surgery    Follow Up: 6 weeks with Dr. Cloud    If you have any further questions for your physician or physician s care team you can call [phone] and use option 3 to leave a voice message. Calls received during business hours will be returned same day.

## 2021-07-22 NOTE — LETTER
7/22/2021         RE: Ric Izaguirre  [address]  [place] [address]        Dear Colleague,    Thank you for referring your patient, Ric Izaguirre, to the [facility] [place]. Please see a copy of my visit note below.    ASSESSMENT & PLAN    Ric was seen today for pain.    Diagnoses and all orders for this visit:    Injury of right shoulder, subsequent encounter    Tendinitis of right rotator cuff      This issue is chronic and Improving.  Improving with PT, encouraged him to schedule more appointments.  Discussed repeat injection or orthopedic surgery referral, patient would like to avoid given improvement.    Plan:  - Today's Plan of Care:  Continue Physical Therapy  Work letter updated    -We also discussed other future treatment options:  Consideration of repeat injection  Referral to Orthopedic Surgery    Follow Up: 6 weeks with Dr. Cloud    Concerning signs and symptoms were reviewed.  The patient expressed understanding of this management plan and all questions were answered at this time.    Mayuri Leal MD [facility]  Sports Medicine Physician  [facility]      SUBJECTIVE- Interim History July 22, 2021    Chief Complaint   Patient presents with     Right Shoulder - Pain       iRc Izaguirre is a 36 year old male who is seen in f/u up for    Injury of right shoulder, subsequent encounter  Tendinitis of right rotator cuff.  Since last visit on 6/10/21 patient has been feeling not too bad. He doesn't believe his shoulder is 100% like it used to be. Physical therapy has improved his symptoms, but he continues to have some issue.  He states he continues to have the sharp pain on the outside of the shoulder (points to distal middle deltoid area) with certain motions.  Patient is able to do his job, but he continues to need the restrictions for repetitive motions.  Physical therapy is helping him a lot.     - Now ~ 12/19/21 initial  "injury date  Note: patient needs updated workability    Worsened by: positioning, sleeping position, driving, reaching across the body  Better with: resting, asking for help with job, icing, physical therapy, HEP  Treatments tried: rest/activity avoidance, ice, heat, Tylenol, ibuprofen, home exercises, physical therapy (4 visits), previous imaging (xray ) and corticosteroid injection with [facility] 3/2/21 that provided  4 week(s) of relief  Associated symptoms:  weakness of shoulder, locking or catching and sharp pain with specific movements    The patient is seen with a QRC (Qualified Rehabilitation Consultant).  The patient is Right handed    Orthopedic/Surgical history: NO  Social History/Occupation: nursing assistant    No family history pertinent to patient's problem today.    REVIEW OF SYSTEMS:  Review of Systems  Skin: no bruising, no swelling  Musculoskeletal: as above  Neurologic: no numbness, paresthesias  Remainder of review of systems is negative including constitutional, CV, pulmonary, GI, except as noted in HPI or medical history.    OBJECTIVE:  /79   Ht 1.626 m (5' 4\")   Wt 68 kg (150 lb)   BMI 25.75 kg/m       GENERAL APPEARANCE: healthy, alert and no distress   GAIT: NORMAL  SKIN: no suspicious lesions or rashes  HEENT: Sclera clear, anicteric  CV: good peripheral pulses  RESP: Breathing not labored  NEURO: Normal strength and tone, mentation intact and speech normal  PSYCH:  mentation appears normal and affect normal/bright    Bilateral Shoulder exam  Inspection and Posture:       normal    Skin:        no visible deformities    Tender:        none    Non Tender:       remainder of shoulder bilateral    ROM:        Slightly decreased active ROM on the right compared to left       asymmetric scapular motion    Painful motions:       end range flexion and elevation right    Strength:        abduction 5/5 bilateral       flexion 5/5 bilateral       internal rotation 5/5 bilateral       " external rotation 5/5 bilateral    Impingement testing:       neg (-) Neer right       positive (+) [place] right  - some pain with passive motion    Sensation:        normal sensation over shoulder and upper extremity     RADIOLOGY:  Final results and radiologist's interpretation, available in the [facility] health record.  Images were reviewed with the patient in the office today.  My personal interpretation of the performed imaging:   MR Right Shoulder 2/27/2021 - bursal sided fraying infraspinatus, supraspinatus tendinosis    Review of the result(s) of each unique test - MRI           Again, thank you for allowing me to participate in the care of your patient.        Sincerely,        Mayuri Leal MD

## 2021-07-22 NOTE — LETTER
[facility] [place]  [address]  [place] [address]  Phone: [phone]  Fax: [phone]      REPORT OF WORK ABILITY    NOTE TO EMPLOYEE: You must promptly provide a copy of this report to your  employer or worker's compensation insurer, and Qualified Rehabilitation Consultant.    Date: 7/22/2021                     Employee Name: Ric Izaguirre         YOB: 1985  Medical Record Number: [id number]   Soc.Sec.No: [id number]  Employer: None                Date of Injury: 12/19/20  Managed Care Organization / Insurance Company Name: UNKNOWN     Diagnosis: Right Shoulder injury  Work Related: yes     MMI: NO   Permanent Partial Disability(PPD) likely: UNKNOWN     EMPLOYEE IS ABLE TO WORK: Return to work with restrictions on next available     RESTRICTIONS IF ANY:      Shoulder/Elbow:  right Avoid outstretched arms  Avoid repetitive motion  Avoid overhead work     OTHER RESTRICTIONS: None     TREATMENT PLAN/NOTES: Continue physical therapy and follow up in 6 weeks    Mayuri Leal MD

## 2021-07-22 NOTE — PROGRESS NOTES
ASSESSMENT & PLAN    Ric was seen today for pain.    Diagnoses and all orders for this visit:    Injury of right shoulder, subsequent encounter    Tendinitis of right rotator cuff      This issue is chronic and Improving.  Improving with PT, encouraged him to schedule more appointments.  Discussed repeat injection or orthopedic surgery referral, patient would like to avoid given improvement.    Plan:  - Today's Plan of Care:  Continue Physical Therapy  Work letter updated    -We also discussed other future treatment options:  Consideration of repeat injection  Referral to Orthopedic Surgery    Follow Up: 6 weeks with Dr. Cloud    Concerning signs and symptoms were reviewed.  The patient expressed understanding of this management plan and all questions were answered at this time.    Mayuri Leal MD [facility]  Sports Medicine Physician  [facility]      SUBJECTIVE- Interim History July 22, 2021    Chief Complaint   Patient presents with     Right Shoulder - Pain       Ric Izaguirre is a 36 year old male who is seen in f/u up for    Injury of right shoulder, subsequent encounter  Tendinitis of right rotator cuff.  Since last visit on 6/10/21 patient has been feeling not too bad. He doesn't believe his shoulder is 100% like it used to be. Physical therapy has improved his symptoms, but he continues to have some issue.  He states he continues to have the sharp pain on the outside of the shoulder (points to distal middle deltoid area) with certain motions.  Patient is able to do his job, but he continues to need the restrictions for repetitive motions.  Physical therapy is helping him a lot.     - Now ~ 12/19/21 initial injury date  Note: patient needs updated workability    Worsened by: positioning, sleeping position, driving, reaching across the body  Better with: resting, asking for help with job, icing, physical therapy, HEP  Treatments tried: rest/activity avoidance, ice, heat,  "Tylenol, ibuprofen, home exercises, physical therapy (4 visits), previous imaging (xray ) and corticosteroid injection with [facility] 3/2/21 that provided  4 week(s) of relief  Associated symptoms:  weakness of shoulder, locking or catching and sharp pain with specific movements    The patient is seen with a QRC (Qualified Rehabilitation Consultant).  The patient is Right handed    Orthopedic/Surgical history: NO  Social History/Occupation: nursing assistant    No family history pertinent to patient's problem today.    REVIEW OF SYSTEMS:  Review of Systems  Skin: no bruising, no swelling  Musculoskeletal: as above  Neurologic: no numbness, paresthesias  Remainder of review of systems is negative including constitutional, CV, pulmonary, GI, except as noted in HPI or medical history.    OBJECTIVE:  /79   Ht 1.626 m (5' 4\")   Wt 68 kg (150 lb)   BMI 25.75 kg/m       GENERAL APPEARANCE: healthy, alert and no distress   GAIT: NORMAL  SKIN: no suspicious lesions or rashes  HEENT: Sclera clear, anicteric  CV: good peripheral pulses  RESP: Breathing not labored  NEURO: Normal strength and tone, mentation intact and speech normal  PSYCH:  mentation appears normal and affect normal/bright    Bilateral Shoulder exam  Inspection and Posture:       normal    Skin:        no visible deformities    Tender:        none    Non Tender:       remainder of shoulder bilateral    ROM:        Slightly decreased active ROM on the right compared to left       asymmetric scapular motion    Painful motions:       end range flexion and elevation right    Strength:        abduction 5/5 bilateral       flexion 5/5 bilateral       internal rotation 5/5 bilateral       external rotation 5/5 bilateral    Impingement testing:       neg (-) Neer right       positive (+) [place] right  - some pain with passive motion    Sensation:        normal sensation over shoulder and upper extremity     RADIOLOGY:  Final results and radiologist's " interpretation, available in the [facility] health record.  Images were reviewed with the patient in the office today.  My personal interpretation of the performed imaging:   MR Right Shoulder 2/27/2021 - bursal sided fraying infraspinatus, supraspinatus tendinosis    Review of the result(s) of each unique test - MRI

## 2021-08-10 ENCOUNTER — THERAPY VISIT (OUTPATIENT)
Dept: PHYSICAL THERAPY | Facility: CLINIC | Age: 36
End: 2021-08-10
Payer: OTHER MISCELLANEOUS

## 2021-08-10 DIAGNOSIS — S49.91XD INJURY OF RIGHT SHOULDER, SUBSEQUENT ENCOUNTER: Primary | ICD-10-CM

## 2021-08-10 PROCEDURE — 97110 THERAPEUTIC EXERCISES: CPT | Mod: GP | Performed by: PHYSICAL THERAPIST

## 2021-08-10 PROCEDURE — 97140 MANUAL THERAPY 1/> REGIONS: CPT | Mod: GP | Performed by: PHYSICAL THERAPIST

## 2021-08-10 NOTE — PROGRESS NOTES
Subjective:  HPI  Physical Exam                    Objective:  System                   Shoulder Evaluation:  ROM:  AROM:    Flexion:  Left:  138 guarded    Right:  135    Abduction:  Left: 130 sharp pain    Right:  73 apin             Elbow Flexion:  Right:  WNL  Elbow Extension:  Right:  WNL    Extension/Internal Rotation:  Right:  To sacrum           Strength:    Flexion: Right: 3-/5     Pain:     Abduction:  Right: 3/5     Pain:    Internal Rotation:  Right: 5/5     Pain:  External Rotation:   Right:4/5   Strong/painful    Pain:                                                     General     ROS    Assessment/Plan:    PROGRESS  REPORT    Progress reporting period is from 6/24/2021 to 8/10/2021.       SUBJECTIVE  I did something very bad on Sunday.  I am not even sure what caused it specifically.  I went to the [email or web address] pool [place] beach with my children and when I got into the car I could hardly drive.  Denies any swimming strokes, lifting children.    It was sore to sleep and move the arm.  Pain is on the top of the arm, 8/10 for today.  Yesterday and Sunday evening it was terrible.  I didn't do any ice as I was just struggling to get through a day of work.    Current pain level is 8/10.     Previous pain level was 8/10.   Changes in function:  None.  Pt with a current flare up in symptoms.   Adverse reaction to treatment or activity: None    OBJECTIVE  Changes noted in objective findings:  The objective findings below are from DOS 8-.  - Reviewed isometrics within the shoulder for pain management and relief.     - See additional measurements above.        ASSESSMENT/PLAN  Updated problem list and treatment plan: Diagnosis 1:  R shoulder    Pain -  hot/cold therapy, US, electric stimulation, manual therapy, self management, education and home program  Decreased ROM/flexibility - manual therapy and therapeutic exercise  Decreased strength - therapeutic exercise and therapeutic activities  Inflammation - cold  therapy, US, electric stimulation and self management/home program  Impaired gait - gait training  Impaired muscle performance - neuro re-education  Decreased function - therapeutic activities  Impaired posture - neuro re-education  STG/LTGs have been met or progress has been made towards goals:  Yes (See Goal flow sheet completed today.)  Assessment of Progress: The patient has had set backs in their progress.  Self Management Plans:  Patient has been instructed in a home treatment program.  Patient  has been instructed in self management of symptoms.  I have re-evaluated this patient and find that the nature, scope, duration and intensity of the therapy is appropriate for the medical condition of the patient.  Ric continues to require the following intervention to meet STG and LTG's:  PT    Recommendations:  This patient would benefit from continued therapy.     Frequency:  2 X a month, once daily  Duration:  for 2 months      This patient would benefit from further evaluation.  Recommended return to MD due to high level of pain with minimal reported strain through the shoulder.  May benefit from repeat injection or further discussion on benefits of surgical management to achieve high levels of function with the dominant arm.      Please refer to the daily flowsheet for treatment today, total treatment time and time spent performing 1:1 timed codes.

## 2021-08-20 ENCOUNTER — THERAPY VISIT (OUTPATIENT)
Dept: PHYSICAL THERAPY | Facility: CLINIC | Age: 36
End: 2021-08-20
Payer: OTHER MISCELLANEOUS

## 2021-08-20 DIAGNOSIS — S49.91XD INJURY OF RIGHT SHOULDER, SUBSEQUENT ENCOUNTER: ICD-10-CM

## 2021-08-20 PROCEDURE — 97110 THERAPEUTIC EXERCISES: CPT | Mod: GP | Performed by: PHYSICAL THERAPIST

## 2021-08-20 PROCEDURE — 97112 NEUROMUSCULAR REEDUCATION: CPT | Mod: GP | Performed by: PHYSICAL THERAPIST

## 2021-08-31 ENCOUNTER — THERAPY VISIT (OUTPATIENT)
Dept: PHYSICAL THERAPY | Facility: CLINIC | Age: 36
End: 2021-08-31
Payer: OTHER MISCELLANEOUS

## 2021-08-31 DIAGNOSIS — M25.511 RIGHT ANTERIOR SHOULDER PAIN: ICD-10-CM

## 2021-08-31 DIAGNOSIS — S49.91XD INJURY OF RIGHT SHOULDER, SUBSEQUENT ENCOUNTER: Primary | ICD-10-CM

## 2021-08-31 PROCEDURE — 97110 THERAPEUTIC EXERCISES: CPT | Mod: GP | Performed by: PHYSICAL THERAPIST

## 2021-08-31 NOTE — PROGRESS NOTES
Subjective:  HPI  Physical Exam                    Objective:  System    Physical Exam    General     ROS    Assessment/Plan:    PROGRESS  REPORT    Progress reporting period is from 8/10/2021 to 8/31/2021.       SUBJECTIVE  The shoulder pain comes and goes.  When I sleep on it the shoulder is terrible.  When I wake up it is so much pain.  It is not as bad as the last time I saw you but it is still painful.        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      Current pain level is 7/10.     Previous pain level was  8/10.    Changes in function:  Yes, improved since wave pool incident but continued pain.     Adverse reaction to treatment or activity: None    OBJECTIVE  Changes noted in objective findings:  The objective findings below are from DOS 8/31/2021.    - AROM: R shoulder flexion 140 deg, abd 110 deg, IR/ext T7.  Feels crepitis deep in the shoulder with active movement/motion  - MMT: shoulder flexion 4/5 with sharp pain, abd 3-/5 requested avoiding testing due to pain; IR 5/5 no pain, ER 4/5 slight pain.    - Special testing: continued +Neer impingement- chronic RTC dysfunction  - Progressively worsening SPADI score. 46% today.          ASSESSMENT/PLAN  Updated problem list and treatment plan: Diagnosis 1:  R shoulder pain    Pain -  hot/cold therapy, US, manual therapy, self management, education and home program  Decreased ROM/flexibility - manual therapy and therapeutic exercise  Decreased joint mobility - manual therapy and therapeutic exercise  Decreased strength - therapeutic exercise and therapeutic activities  Impaired gait - gait training  Impaired muscle performance - neuro re-education  Decreased function - therapeutic activities  STG/LTGs have been met or progress has been made towards goals:  Yes (See Goal flow sheet completed today.)  Assessment of Progress: The patient's condition has potential to improve.  The patient's progress has plateaued.  Self Management Plans:   Patient has been instructed in a home treatment program.  Patient  has been instructed in self management of symptoms.  I have re-evaluated this patient and find that the nature, scope, duration and intensity of the therapy is appropriate for the medical condition of the patient.  Ric continues to require the following intervention to meet STG and LTG's:  PT    Recommendations:  This patient would benefit from continued therapy.     Frequency:  2 X a month, once daily  Duration:  for 1 months        Please refer to the daily flowsheet for treatment today, total treatment time and time spent performing 1:1 timed codes.

## 2021-09-14 ENCOUNTER — OFFICE VISIT (OUTPATIENT)
Dept: ORTHOPEDICS | Facility: CLINIC | Age: 36
End: 2021-09-14
Payer: OTHER MISCELLANEOUS

## 2021-09-14 VITALS
WEIGHT: 150 LBS | DIASTOLIC BLOOD PRESSURE: 78 MMHG | BODY MASS INDEX: 25.61 KG/M2 | HEIGHT: 64 IN | SYSTOLIC BLOOD PRESSURE: 120 MMHG

## 2021-09-14 DIAGNOSIS — M75.81 TENDINITIS OF RIGHT ROTATOR CUFF: ICD-10-CM

## 2021-09-14 DIAGNOSIS — S49.91XD INJURY OF RIGHT SHOULDER, SUBSEQUENT ENCOUNTER: Primary | ICD-10-CM

## 2021-09-14 PROCEDURE — 99213 OFFICE O/P EST LOW 20 MIN: CPT | Performed by: PEDIATRICS

## 2021-09-14 ASSESSMENT — MIFFLIN-ST. JEOR: SCORE: 1511.4

## 2021-09-14 NOTE — PROGRESS NOTES
"ASSESSMENT & PLAN    Ric was seen today for recheck.    Diagnoses and all orders for this visit:    Injury of right shoulder, subsequent encounter    Tendinitis of right rotator cuff      Overall slow, gradual improvement.  Reviewed again options with continuing with current management (including therapy, work accommodations), additional therapy, repeat subacromial steroid injection, and potential for referral to ortho surgeon. He prefers to avoid surgery as much as possible, and does not desire injection yet.  Continue with work restrictions, and PT.  F/u 2 months, sooner prn.  Questions answered. Discussed signs and symptoms that may indicate more serious issues; the patient was instructed to seek appropriate care if noted. Michael indicates understanding of these issues and agrees with the plan.        [facility] [place]    SUBJECTIVE- Interim History September 14, 2021    Chief Complaint   Patient presents with     Right Shoulder - RECHECK       Ric Izaguirre is a 36 year old male who is seen in f/u up for    Injury of right shoulder, subsequent encounter  Tendinitis of right rotator cuff. Since last visit on 7/22/21 with Dr. Leal, the  patient has attended 1 additional session of PT.  Does wake up at night, but feels there has been some improvement.  Has continued with the work restrictions.   .  - Now ~ 12/19/20, 9 months from initial injury      **  Overall has noted improvement with time.  Notes with waking at night has some right shoulder pain.  Also notes some activities/movements with shoulder (e.g., shoulder rotation) notes some pain.  Able to work, but modifies activities for pain.        No family history pertinent to patient's problem today.     **      REVIEW OF SYSTEMS:  Review of Systems      OBJECTIVE:  /78   Ht 1.626 m (5' 4\")   Wt 68 kg (150 lb)   BMI 25.75 kg/m       GENERAL APPEARANCE: healthy, alert and no distress "     Right shoulder:  Some pain with motion away from body, especially if adding rotation        RADIOLOGY:  Previous imaging:    Results for orders placed or performed during the hospital encounter of 02/27/21   MR Shoulder Right w/o Contrast    Narrative    EXAM: MR Right shoulder without  contrast 2/27/2021 10:24 AM    TECHNIQUE: Multiplanar, multisequence imaging of the right shoulder  were obtained without administration of intravenous or intra-articular  gadolinium contrast using routine protocol.    History: Shoulder pain, rotator cuff disorder suspected, xray done;  Injury of right shoulder, initial encounter     Comparison: Radiographs 2/23/2021    Findings:    ROTATOR CUFF and ASSOCIATED STRUCTURES  Rotator cuff: Supraspinatus tendinosis/strain without tear. Low to  moderate grade bursal sided fraying of the mid infraspinatus at the  footprint. No full-thickness tear. Teres minor and subscapularis are  intact.    Bursa: No subacromial or subdeltoid bursal fluid.    Musculature: Muscle bulk of rotator cuff is preserved.  Deltoid muscle  bulk is also preserved.  No muscle edema.    Acromioclavicular joint  There are mild degenerative changes of the acromioclavicular joint.  Acromion is type 2 in sagittal morphology.  Coracoacromial ligament is  not thickened.    OSSEOUS STRUCTURES  Focal bone marrow edema within the lateral humeral head greater  tuberosity  Insertion sites of the posterior supraspinatus-anterior infraspinatus.  No fracture.    LONG BICIPITAL TENDON  The long head of the biceps tendon is normally situated within the  bicipital groove. No complete or partial biceps tendon tear is  present.    GLENOHUMERAL JOINT  Joint fluid: Physiologic amount of joint fluid is  present.    Cartilage and subarticular bone:  No focal hyaline cartilage defects  are noted. No Hill-Sachs, reverse Hill-Sachs, or bony Bankart lesions  are seen.    Labrum: Limited assessment on this study with relative lack of  joint  distention shows no labral tear.    ANCILLARY FINDINGS:  None      Impression    Impression:    1. Low to moderate grade bursal sided fraying of the mid infraspinatus  at the footprint.    2. Supraspinatus tendinosis without tear.    3. Focal edema within the greater tuberosity at the insertion site of  the posterior supraspinatus-anterior infraspinatus. No fracture. Bone  marrow edema may be related to contusion or enthesopathic change.    CELSO BERRY MD ([place])             25 minutes spent on the date of the encounter doing chart review, history and exam, documentation and further activities per the note

## 2021-09-14 NOTE — LETTER
REPORT OF WORK ABILITY    NOTE TO EMPLOYEE: You must promptly provide a copy of this report to your  employer or worker's compensation insurer, and Qualified Rehabilitation Consultant.    Date: September 14, 2021                    Employee Name: Ric Izaguirre         YOB: 1985  Medical Record Number: [id number]   Soc.Sec.No: [id number]  Employer: None                Date of Injury: 12/19/20  Managed Care Organization / Insurance Company Name: UNKNOWN     Diagnosis: Right Shoulder injury  Work Related: yes     MMI: NO  Permanent Partial Disability (PPD) likely: UNKNOWN     EMPLOYEE IS ABLE TO WORK: Return to work with restrictions on next available     RESTRICTIONS IF ANY:     Shoulder/Elbow:  right Avoid outstretched arms  Avoid repetitive motion  Avoid overhead work     OTHER RESTRICTIONS: None     TREATMENT PLAN/NOTES: Continue physical therapy and follow up in 2 months          Hector Cloud DO, [place]

## 2021-09-14 NOTE — LETTER
9/14/2021         RE: Ric Izaguirre  [address]  [place] [address]        Dear Colleague,    Thank you for referring your patient, Ric Izaguirre, to the [facility] [place]. Please see a copy of my visit note below.    ASSESSMENT & PLAN    Ric was seen today for recheck.    Diagnoses and all orders for this visit:    Injury of right shoulder, subsequent encounter    Tendinitis of right rotator cuff      Overall slow, gradual improvement.  Reviewed again options with continuing with current management (including therapy, work accommodations), additional therapy, repeat subacromial steroid injection, and potential for referral to ortho surgeon. He prefers to avoid surgery as much as possible, and does not desire injection yet.  Continue with work restrictions, and PT.  F/u 2 months, sooner prn.  Questions answered. Discussed signs and symptoms that may indicate more serious issues; the patient was instructed to seek appropriate care if noted. Michael indicates understanding of these issues and agrees with the plan.        Hector Cloud DO  [facility] [place]    SUBJECTIVE- Interim History September 14, 2021    Chief Complaint   Patient presents with     Right Shoulder - RECHECK       Ric Izaguirre is a 36 year old male who is seen in f/u up for    Injury of right shoulder, subsequent encounter  Tendinitis of right rotator cuff. Since last visit on 7/22/21 with Dr. Leal, the  patient has attended 1 additional session of PT.  Does wake up at night, but feels there has been some improvement.  Has continued with the work restrictions.   .  - Now ~ 12/19/20, 9 months from initial injury      **  Overall has noted improvement with time.  Notes with waking at night has some right shoulder pain.  Also notes some activities/movements with shoulder (e.g., shoulder rotation) notes some pain.  Able to work, but modifies  "activities for pain.        No family history pertinent to patient's problem today.     **      REVIEW OF SYSTEMS:  Review of Systems      OBJECTIVE:  /78   Ht 1.626 m (5' 4\")   Wt 68 kg (150 lb)   BMI 25.75 kg/m       GENERAL APPEARANCE: healthy, alert and no distress     Right shoulder:  Some pain with motion away from body, especially if adding rotation        RADIOLOGY:  Previous imaging:    Results for orders placed or performed during the hospital encounter of 02/27/21   MR Shoulder Right w/o Contrast    Narrative    EXAM: MR Right shoulder without  contrast 2/27/2021 10:24 AM    TECHNIQUE: Multiplanar, multisequence imaging of the right shoulder  were obtained without administration of intravenous or intra-articular  gadolinium contrast using routine protocol.    History: Shoulder pain, rotator cuff disorder suspected, xray done;  Injury of right shoulder, initial encounter     Comparison: Radiographs 2/23/2021    Findings:    ROTATOR CUFF and ASSOCIATED STRUCTURES  Rotator cuff: Supraspinatus tendinosis/strain without tear. Low to  moderate grade bursal sided fraying of the mid infraspinatus at the  footprint. No full-thickness tear. Teres minor and subscapularis are  intact.    Bursa: No subacromial or subdeltoid bursal fluid.    Musculature: Muscle bulk of rotator cuff is preserved.  Deltoid muscle  bulk is also preserved.  No muscle edema.    Acromioclavicular joint  There are mild degenerative changes of the acromioclavicular joint.  Acromion is type 2 in sagittal morphology.  Coracoacromial ligament is  not thickened.    OSSEOUS STRUCTURES  Focal bone marrow edema within the lateral humeral head greater  tuberosity  Insertion sites of the posterior supraspinatus-anterior infraspinatus.  No fracture.    LONG BICIPITAL TENDON  The long head of the biceps tendon is normally situated within the  bicipital groove. No complete or partial biceps tendon tear is  present.    GLENOHUMERAL JOINT  Joint " fluid: Physiologic amount of joint fluid is  present.    Cartilage and subarticular bone:  No focal hyaline cartilage defects  are noted. No Hill-Sachs, reverse Hill-Sachs, or bony Bankart lesions  are seen.    Labrum: Limited assessment on this study with relative lack of joint  distention shows no labral tear.    ANCILLARY FINDINGS:  None      Impression    Impression:    1. Low to moderate grade bursal sided fraying of the mid infraspinatus  at the footprint.    2. Supraspinatus tendinosis without tear.    3. Focal edema within the greater tuberosity at the insertion site of  the posterior supraspinatus-anterior infraspinatus. No fracture. Bone  marrow edema may be related to contusion or enthesopathic change.    CELSO BERRY MD ([place])             25 minutes spent on the date of the encounter doing chart review, history and exam, documentation and further activities per the note                 Again, thank you for allowing me to participate in the care of your patient.        Sincerely,        Hector Cloud DO

## 2021-09-17 ENCOUNTER — THERAPY VISIT (OUTPATIENT)
Dept: PHYSICAL THERAPY | Facility: CLINIC | Age: 36
End: 2021-09-17
Payer: OTHER MISCELLANEOUS

## 2021-09-17 DIAGNOSIS — S49.91XD INJURY OF RIGHT SHOULDER, SUBSEQUENT ENCOUNTER: ICD-10-CM

## 2021-09-17 PROCEDURE — 97110 THERAPEUTIC EXERCISES: CPT | Mod: GP | Performed by: PHYSICAL THERAPIST

## 2021-09-17 PROCEDURE — 97112 NEUROMUSCULAR REEDUCATION: CPT | Mod: GP | Performed by: PHYSICAL THERAPIST

## 2021-09-23 ENCOUNTER — TELEPHONE (OUTPATIENT)
Dept: PHYSICAL THERAPY | Facility: CLINIC | Age: 36
End: 2021-09-23

## 2021-09-23 ENCOUNTER — THERAPY VISIT (OUTPATIENT)
Dept: PHYSICAL THERAPY | Facility: CLINIC | Age: 36
End: 2021-09-23
Payer: OTHER MISCELLANEOUS

## 2021-09-23 DIAGNOSIS — S49.91XD INJURY OF RIGHT SHOULDER, SUBSEQUENT ENCOUNTER: Primary | ICD-10-CM

## 2021-09-23 DIAGNOSIS — M25.511 RIGHT ANTERIOR SHOULDER PAIN: ICD-10-CM

## 2021-09-23 PROCEDURE — 97112 NEUROMUSCULAR REEDUCATION: CPT | Mod: GP | Performed by: PHYSICAL THERAPIST

## 2021-09-23 PROCEDURE — 97110 THERAPEUTIC EXERCISES: CPT | Mod: GP | Performed by: PHYSICAL THERAPIST

## 2021-09-23 NOTE — LETTER
[facility] [place]  [address]  [address]  [place] [address]  [phone]    2021    Re: Ric Izaguirre   :   1985  MRN:  [id number]   REFERRING PHYSICIAN:   Mayuri PÉREZ [facility] [place]    Date of Initial Evaluation:  2021  Visits:  Rxs Used: 18  Reason for Referral:     Injury of right shoulder, subsequent encounter  Right anterior shoulder pain    EVALUATION SUMMARY    Subjective:  HPI  Physical Exam                  Objective:  System    Shoulder Evaluation:  ROM:  AROM:    Flexion:  Right:  147  Abduction:  Right:  114  Elbow Flexion:  Right:  WNL  Elbow Extension:  Right:  WNL  Extension/Internal Rotation:  Right:  T10      Strength:    Flexion: Left:4+/5  Strong/painful    Pain: +++      Abduction:  Right: 5-/5    Strong/painful    Pain:+  Internal Rotation:  Right: 5/5     Pain:  External Rotation:   Right:/5   Strong/painful    Pain:++    Elbow Flexion:  Right:5/5     Pain:  Elbow Extension:  Right:5/5     Pain:    General   ROS    Assessment/Plan:    PROGRESS  REPORT    Progress reporting period is from 2021 to 2021.       Re: Ric Izaguirre   :   1985    SUBJECTIVE  The shoulder is better than it was previously but it not totally gone.  My prayer is that it would go totally away.  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  I still cannot reach high.    MD advised that I continue with physical therapy and follow up 6 weeks from 2021.   I am currently not using weights with any of the exercises.    Current pain level is 4/10  .     Previous pain level was  7/10  .   Changes in function:  Yes (See Goal flowsheet attached for changes in current functional level)  Adverse reaction to treatment or activity: None    OBJECTIVE  Changes noted in objective findings:  The objective  findings below are from DOS 9/23/2021.  - See above.  Overall improving.  HEP updated to incr resistance progressively      ASSESSMENT/PLAN  Updated problem list and treatment plan: Diagnosis 1:  R shoulder injury    Pain -  hot/cold therapy, US, manual therapy, self management, education and home program  Decreased ROM/flexibility - manual therapy and therapeutic exercise  Decreased joint mobility - manual therapy and therapeutic exercise  Decreased strength - therapeutic exercise and therapeutic activities  Impaired muscle performance - neuro re-education  Decreased function - therapeutic activities  Impaired posture - neuro re-education  STG/LTGs have been met or progress has been made towards goals:  Yes (See Goal flow sheet completed today.)  Assessment of Progress: The patient's condition is improving.  Self Management Plans:  Patient has been instructed in a home treatment program.  Patient  has been instructed in self management of symptoms.  I have re-evaluated this patient and find that the nature, scope, duration and intensity of the therapy is appropriate for the medical condition of the patient.  Ric continues to require the following intervention to meet STG and LTG's:  PT    Recommendations:  This patient would benefit from continued therapy.     Frequency:  4 visits   Duration:  for 6 weeks until MD follow up    Thank you for your referral.    INQUIRIES  Therapist: Harriett Dueñas PT   [address] [address]  Phone: [phone]  Fax: [phone]

## 2021-09-23 NOTE — TELEPHONE ENCOUNTER
9/23/2021  Called and requested 4 additional PT visits.  Updated PN in chart.      SALOMON (R) shoulder / Dr. Mayuri Leal @ [facility] / DOI:   12- / Adj: Denise Tapia @ [facility] [phone] / CL#:   [id number]  6/29: Auth approved 6 addt'l visits (Total: 18) ~LL

## 2021-09-23 NOTE — PROGRESS NOTES
Subjective:  HPI  Physical Exam                    Objective:  System                   Shoulder Evaluation:  ROM:  AROM:    Flexion:  Right:  147    Abduction:  Right:  114            Elbow Flexion:  Right:  WNL  Elbow Extension:  Right:  WNL    Extension/Internal Rotation:  Right:  T10          Strength:    Flexion: Left:4+/5  Strong/painful    Pain: +++        Abduction:  Right: 5-/5    Strong/painful    Pain:+    Internal Rotation:  Right: 5/5     Pain:  External Rotation:   Right:/5   Strong/painful    Pain:++        Elbow Flexion:  Right:5/5     Pain:  Elbow Extension:  Right:5/5     Pain:                                           General     ROS    Assessment/Plan:    PROGRESS  REPORT    Progress reporting period is from 8/31/2021 to 9/23/2021.       SUBJECTIVE  The shoulder is better than it was previously but it not totally gone.  My prayer is that it would go totally away.  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  I still cannot reach high.      MD advised that I continue with physical therapy and follow up 6 weeks from 9/14/2021.      I am currently not using weights with any of the exercises.      Current pain level is 4/10  .     Previous pain level was  7/10  .   Changes in function:  Yes (See Goal flowsheet attached for changes in current functional level)  Adverse reaction to treatment or activity: None    OBJECTIVE  Changes noted in objective findings:  The objective findings below are from DOS 9/23/2021.  - See above.  Overall improving.  HEP updated to incr resistance progressively        ASSESSMENT/PLAN  Updated problem list and treatment plan: Diagnosis 1:  R shoulder injury    Pain -  hot/cold therapy, US, manual therapy, self management, education and home program  Decreased ROM/flexibility - manual therapy and therapeutic exercise  Decreased joint mobility - manual therapy and therapeutic exercise  Decreased  strength - therapeutic exercise and therapeutic activities  Impaired muscle performance - neuro re-education  Decreased function - therapeutic activities  Impaired posture - neuro re-education  STG/LTGs have been met or progress has been made towards goals:  Yes (See Goal flow sheet completed today.)  Assessment of Progress: The patient's condition is improving.  Self Management Plans:  Patient has been instructed in a home treatment program.  Patient  has been instructed in self management of symptoms.  I have re-evaluated this patient and find that the nature, scope, duration and intensity of the therapy is appropriate for the medical condition of the patient.  Ric continues to require the following intervention to meet STG and LTG's:  PT    Recommendations:  This patient would benefit from continued therapy.     Frequency:  4 visits   Duration:  for 6 weeks until MD follow up          Please refer to the daily flowsheet for treatment today, total treatment time and time spent performing 1:1 timed codes.

## 2021-09-28 ENCOUNTER — TELEPHONE (OUTPATIENT)
Dept: PHYSICAL THERAPY | Facility: CLINIC | Age: 36
End: 2021-09-28

## 2021-09-28 ENCOUNTER — THERAPY VISIT (OUTPATIENT)
Dept: PHYSICAL THERAPY | Facility: CLINIC | Age: 36
End: 2021-09-28
Payer: OTHER MISCELLANEOUS

## 2021-09-28 DIAGNOSIS — S49.91XD INJURY OF RIGHT SHOULDER, SUBSEQUENT ENCOUNTER: Primary | ICD-10-CM

## 2021-09-28 PROCEDURE — 99207 PR NO CHARGE LOS: CPT | Mod: GP | Performed by: PHYSICAL THERAPIST

## 2021-09-28 NOTE — TELEPHONE ENCOUNTER
Kaci Poole- called to follow up on authorization.      Harriett Dueñas spoke with [facility] upon patient arrival and she will also call with request for additional authorization   Denise Tapia @ [facility] [phone] / CL#:   [id number]

## 2021-09-28 NOTE — TELEPHONE ENCOUNTER
[10:36 AM] Kiera Poole received feedback from Denise viera for 4 additional PT visits.  Adj: Denise Tapia @ [facility] [phone] / CL#:   [id number]    She just asks that we scan and send the progress note to her as she said she never received that. her fax # is  according to the voicemail

## 2021-10-03 ENCOUNTER — HEALTH MAINTENANCE LETTER (OUTPATIENT)
Age: 36
End: 2021-10-03

## 2021-11-09 ENCOUNTER — THERAPY VISIT (OUTPATIENT)
Dept: PHYSICAL THERAPY | Facility: CLINIC | Age: 36
End: 2021-11-09
Payer: OTHER MISCELLANEOUS

## 2021-11-09 DIAGNOSIS — S49.91XD INJURY OF RIGHT SHOULDER, SUBSEQUENT ENCOUNTER: ICD-10-CM

## 2021-11-09 PROCEDURE — 97112 NEUROMUSCULAR REEDUCATION: CPT | Mod: GP | Performed by: PHYSICAL THERAPIST

## 2021-11-09 PROCEDURE — 97110 THERAPEUTIC EXERCISES: CPT | Mod: GP | Performed by: PHYSICAL THERAPIST

## 2021-11-09 NOTE — PROGRESS NOTES
Assessment/Plan:    PROGRESS  REPORT    Progress reporting period is from 9/23/2021 to 11/9/2021.       SUBJECTIVE  Subjective changes noted by patient:   Subjective: pt feels that he feels his shoulder controls itself. pt reports he does do the band exercises regularly at home. 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 Shoulder is not currently flared up.  Current Pain level: 0/10.     Initial Pain level: 0/10.   Changes in function:  None  Adverse reaction to treatment or activity: None    OBJECTIVE  Changes noted in objective findings:  None  Objective: Shoulder AROM: full shoulder flexion, abduction, ER pain free today. Shoulder Strength: observed poor scapular control with exercises today, cueing on scapular dyskinesis  with reaching exercises. + pec tightness RUE.     ASSESSMENT/PLAN  Updated problem list and treatment plan: Diagnosis 1:  R Shoulder Pain  Pain -  self management, education and home program  Decreased strength - therapeutic exercise and therapeutic activities  Impaired posture - neuro re-education  STG/LTGs have been met or progress has been made towards goals:  None  Assessment of Progress: The patient's progress has plateaued.  Self Management Plans:  Patient has been instructed in a home treatment program.  I have re-evaluated this patient and find that the nature, scope, duration and intensity of the therapy is appropriate for the medical condition of the patient.  Ric continues to require the following intervention to meet STG and LTG's:  PT    Recommendations:  This patient would benefit from continued therapy.     Frequency:  1 X week, once daily  Duration:  for 3 weeks per current plan of care. Pt requires further education regarding scapular strengthening to improve shoulder pain. Encourage independence with HEP and self management.        Please refer to the daily flowsheet for treatment today, total treatment  time and time spent performing 1:1 timed codes.

## 2021-11-15 ENCOUNTER — OFFICE VISIT (OUTPATIENT)
Dept: ORTHOPEDICS | Facility: CLINIC | Age: 36
End: 2021-11-15
Payer: OTHER MISCELLANEOUS

## 2021-11-15 VITALS
DIASTOLIC BLOOD PRESSURE: 76 MMHG | BODY MASS INDEX: 26.46 KG/M2 | HEIGHT: 64 IN | WEIGHT: 155 LBS | SYSTOLIC BLOOD PRESSURE: 118 MMHG

## 2021-11-15 DIAGNOSIS — S49.91XD INJURY OF RIGHT SHOULDER, SUBSEQUENT ENCOUNTER: Primary | ICD-10-CM

## 2021-11-15 DIAGNOSIS — M75.81 TENDINITIS OF RIGHT ROTATOR CUFF: ICD-10-CM

## 2021-11-15 PROCEDURE — 99213 OFFICE O/P EST LOW 20 MIN: CPT | Performed by: PEDIATRICS

## 2021-11-15 ASSESSMENT — MIFFLIN-ST. JEOR: SCORE: 1534.08

## 2021-11-15 NOTE — PATIENT INSTRUCTIONS
Reviewed options, including updating imaging, continuing with physical therapy, use of oral medication, injection therapy (repeat steroid injection, versus possibly PRP or Tenex), and referral to orthopedic surgeon.  Plan to continue with physical therapy, as long as there is some continued improvement in symptoms overall, and some remaining work to be done including with scapular mechanics and strength.  Updated letter today, continue with current work restrictions.  Plan follow-up approximately 2 months.    If you have any further questions for your physician or physician s care team you can call [phone] and use option 3 to leave a voice message. Calls received during business hours will be returned same day.

## 2021-11-15 NOTE — LETTER
REPORT OF WORK ABILITY    NOTE TO EMPLOYEE: You must promptly provide a copy of this report to your  employer or worker's compensation insurer, and Qualified Rehabilitation Consultant.    Date: November 15, 2021                    Employee Name: Ric Izaguirre         YOB: 1985  Medical Record Number: [id number]   Soc.Sec.No: [id number]  Employer: None                Date of Injury: 12/19/20  Managed Care Organization / Insurance Company Name: UNKNOWN     Diagnosis: Right Shoulder injury  Work Related: yes     MMI: NO  Permanent Partial Disability (PPD) likely: UNKNOWN     EMPLOYEE IS ABLE TO WORK: Return to work with restrictions on next available work date     RESTRICTIONS IF ANY:     Shoulder/Elbow:  right Avoid outstretched arms  Avoid repetitive motion  Avoid overhead work     OTHER RESTRICTIONS: None     TREATMENT PLAN/NOTES: Continue physical therapy and follow up in 2 months            Hector Cloud DO, [place]

## 2021-11-15 NOTE — PROGRESS NOTES
ASSESSMENT & PLAN    Ric was seen today for recheck.    Diagnoses and all orders for this visit:    Injury of right shoulder, subsequent encounter    Tendinitis of right rotator cuff      QRC present for today's visit.  Overall improved compared to when this first started, but somewhat labile course, and ongoing symptoms.  We discussed the following: symptom treatment, activity modification/rest, imaging, rehab, injection therapy, medication, referral to ortho surgeon and possibly future work conditioning if completed PT but still needing to work on strength. Following discussion, plan:  He is not particularly interested in repeat injection. Also not interested in PRP or Tenex currently.  No indication for repeat MRI.  No clear surgical indication either, given improvement with time, as well as previous MRI.  Will plan to continue with PT, and anticipate continued improvement with time. Updated letter for work, continue with current restrictions.  Future considerations are as noted.  Questions answered. Discussed signs and symptoms that may indicate more serious issues; the patient was instructed to seek appropriate care if noted. Michael indicates understanding of these issues and agrees with the plan.        See Patient Instructions  Patient Instructions   Reviewed options, including updating imaging, continuing with physical therapy, use of oral medication, injection therapy (repeat steroid injection, versus possibly PRP or Tenex), and referral to orthopedic surgeon.  Plan to continue with physical therapy, as long as there is some continued improvement in symptoms overall, and some remaining work to be done including with scapular mechanics and strength.  Updated letter today, continue with current work restrictions.  Plan follow-up approximately 2 months.    If you have any further questions for your physician or physician s care team you can call [phone] and use option 3 to leave a voice message.  "Calls received during business hours will be returned same day.        Hector Cloud DO  [facility] [place]    SUBJECTIVE- Interim History November 15, 2021    Chief Complaint   Patient presents with     Right Shoulder - GOECK       Ric Izaguirre is a 36 year old male who is seen in f/u up for    Injury of right shoulder, subsequent encounter  Tendinitis of right rotator cuff. Since last visit on 9/14/21 patient has continued with therapy.  He states his shoulder is \"so so\".  -12/19/20 Now ~ 11 months from initial injury  States pain is still present, but it is not any worse, but not any better.  Feels it is a 5/10.   Is currently working within his restrictions.  Pain with laying on his right side. Or leaning arm in a slightly abducted shoulder he has pain.     Worsened by: lying on his side, resting on his side.    Better with: PT    No family history pertinent to patient's problem today.   **  No pain in shoulder currently.  Nighttime pain present each day. If leaning on right elbow, pressure through shoulder does cause some discomfort. When pain present, is sharp pain.  If moving the shoulder again, pain improves; can improve over minutes to hours if has a flare of pain.  Overall this condition is better than when it first started.            REVIEW OF SYSTEMS:  Review of Systems      OBJECTIVE:  /76   Ht 1.626 m (5' 4\")   Wt 70.3 kg (155 lb)   BMI 26.61 kg/m       GENERAL APPEARANCE: healthy, alert and no distress   GAIT: NORMAL  SKIN: no suspicious lesions or rashes  HEENT: Sclera clear, anicteric  CV: no lower extremity edema, good peripheral pulses  RESP: Breathing not labored  NEURO: Normal strength and tone, mentation intact and speech normal  PSYCH:  mentation appears normal and affect normal/bright      Right Shoulder exam    ROM:      forward flexion grossly full, symmetric, no change        abduction 120-130, with pain       internal rotation " grossly full, mild pain       external rotation grossly full, no change    Some right scapular prominence compared to left with flexion, abduction    RADIOLOGY:  Previous MRI:    Results for orders placed or performed during the hospital encounter of 02/27/21   MR Shoulder Right w/o Contrast    Narrative    EXAM: MR Right shoulder without  contrast 2/27/2021 10:24 AM    TECHNIQUE: Multiplanar, multisequence imaging of the right shoulder  were obtained without administration of intravenous or intra-articular  gadolinium contrast using routine protocol.    History: Shoulder pain, rotator cuff disorder suspected, xray done;  Injury of right shoulder, initial encounter     Comparison: Radiographs 2/23/2021    Findings:    ROTATOR CUFF and ASSOCIATED STRUCTURES  Rotator cuff: Supraspinatus tendinosis/strain without tear. Low to  moderate grade bursal sided fraying of the mid infraspinatus at the  footprint. No full-thickness tear. Teres minor and subscapularis are  intact.    Bursa: No subacromial or subdeltoid bursal fluid.    Musculature: Muscle bulk of rotator cuff is preserved.  Deltoid muscle  bulk is also preserved.  No muscle edema.    Acromioclavicular joint  There are mild degenerative changes of the acromioclavicular joint.  Acromion is type 2 in sagittal morphology.  Coracoacromial ligament is  not thickened.    OSSEOUS STRUCTURES  Focal bone marrow edema within the lateral humeral head greater  tuberosity  Insertion sites of the posterior supraspinatus-anterior infraspinatus.  No fracture.    LONG BICIPITAL TENDON  The long head of the biceps tendon is normally situated within the  bicipital groove. No complete or partial biceps tendon tear is  present.    GLENOHUMERAL JOINT  Joint fluid: Physiologic amount of joint fluid is  present.    Cartilage and subarticular bone:  No focal hyaline cartilage defects  are noted. No Hill-Sachs, reverse Hill-Sachs, or bony Bankart lesions  are seen.    Labrum: Limited  assessment on this study with relative lack of joint  distention shows no labral tear.    ANCILLARY FINDINGS:  None      Impression    Impression:    1. Low to moderate grade bursal sided fraying of the mid infraspinatus  at the footprint.    2. Supraspinatus tendinosis without tear.    3. Focal edema within the greater tuberosity at the insertion site of  the posterior supraspinatus-anterior infraspinatus. No fracture. Bone  marrow edema may be related to contusion or enthesopathic change.    CELSO BERRY MD ([place])

## 2021-11-15 NOTE — LETTER
11/15/2021         RE: Ric Izaguirre  [address]  [place] [address]        Dear Colleague,    Thank you for referring your patient, Ric Izaguirre, to the [facility]. Please see a copy of my visit note below.    ASSESSMENT & PLAN    Ric was seen today for recheck.    Diagnoses and all orders for this visit:    Injury of right shoulder, subsequent encounter    Tendinitis of right rotator cuff      QRC present for today's visit.  Overall improved compared to when this first started, but somewhat labile course, and ongoing symptoms.  We discussed the following: symptom treatment, activity modification/rest, imaging, rehab, injection therapy, medication, referral to ortho surgeon and possibly future work conditioning if completed PT but still needing to work on strength. Following discussion, plan:  He is not particularly interested in repeat injection. Also not interested in PRP or Tenex currently.  No indication for repeat MRI.  No clear surgical indication either, given improvement with time, as well as previous MRI.  Will plan to continue with PT, and anticipate continued improvement with time. Updated letter for work, continue with current restrictions.  Future considerations are as noted.  Questions answered. Discussed signs and symptoms that may indicate more serious issues; the patient was instructed to seek appropriate care if noted. Michael indicates understanding of these issues and agrees with the plan.        See Patient Instructions  Patient Instructions   Reviewed options, including updating imaging, continuing with physical therapy, use of oral medication, injection therapy (repeat steroid injection, versus possibly PRP or Tenex), and referral to orthopedic surgeon.  Plan to continue with physical therapy, as long as there is some continued improvement in symptoms overall, and some remaining work to be done including with scapular mechanics  "and strength.  Updated letter today, continue with current work restrictions.  Plan follow-up approximately 2 months.    If you have any further questions for your physician or physician s care team you can call [phone] and use option 3 to leave a voice message. Calls received during business hours will be returned same day.        Hcetor Cloud [facility]    SUBJECTIVE- Interim History November 15, 2021    Chief Complaint   Patient presents with     Right Shoulder - RECHECK       Ric Izaguirre is a 36 year old male who is seen in f/u up for    Injury of right shoulder, subsequent encounter  Tendinitis of right rotator cuff. Since last visit on 9/14/21 patient has continued with therapy.  He states his shoulder is \"so so\".  -12/19/20 Now ~ 11 months from initial injury  States pain is still present, but it is not any worse, but not any better.  Feels it is a 5/10.   Is currently working within his restrictions.  Pain with laying on his right side. Or leaning arm in a slightly abducted shoulder he has pain.     Worsened by: lying on his side, resting on his side.    Better with: PT    No family history pertinent to patient's problem today.   **  No pain in shoulder currently.  Nighttime pain present each day. If leaning on right elbow, pressure through shoulder does cause some discomfort. When pain present, is sharp pain.  If moving the shoulder again, pain improves; can improve over minutes to hours if has a flare of pain.  Overall this condition is better than when it first started.            REVIEW OF SYSTEMS:  Review of Systems      OBJECTIVE:  /76   Ht 1.626 m (5' 4\")   Wt 70.3 kg (155 lb)   BMI 26.61 kg/m       GENERAL APPEARANCE: healthy, alert and no distress   GAIT: NORMAL  SKIN: no suspicious lesions or rashes  HEENT: Sclera clear, anicteric  CV: no lower extremity edema, good peripheral pulses  RESP: Breathing not labored  NEURO: Normal " strength and tone, mentation intact and speech normal  PSYCH:  mentation appears normal and affect normal/bright      Right Shoulder exam    ROM:      forward flexion grossly full, symmetric, no change        abduction 120-130, with pain       internal rotation grossly full, mild pain       external rotation grossly full, no change    Some right scapular prominence compared to left with flexion, abduction    RADIOLOGY:  Previous MRI:    Results for orders placed or performed during the hospital encounter of 02/27/21   MR Shoulder Right w/o Contrast    Narrative    EXAM: MR Right shoulder without  contrast 2/27/2021 10:24 AM    TECHNIQUE: Multiplanar, multisequence imaging of the right shoulder  were obtained without administration of intravenous or intra-articular  gadolinium contrast using routine protocol.    History: Shoulder pain, rotator cuff disorder suspected, xray done;  Injury of right shoulder, initial encounter     Comparison: Radiographs 2/23/2021    Findings:    ROTATOR CUFF and ASSOCIATED STRUCTURES  Rotator cuff: Supraspinatus tendinosis/strain without tear. Low to  moderate grade bursal sided fraying of the mid infraspinatus at the  footprint. No full-thickness tear. Teres minor and subscapularis are  intact.    Bursa: No subacromial or subdeltoid bursal fluid.    Musculature: Muscle bulk of rotator cuff is preserved.  Deltoid muscle  bulk is also preserved.  No muscle edema.    Acromioclavicular joint  There are mild degenerative changes of the acromioclavicular joint.  Acromion is type 2 in sagittal morphology.  Coracoacromial ligament is  not thickened.    OSSEOUS STRUCTURES  Focal bone marrow edema within the lateral humeral head greater  tuberosity  Insertion sites of the posterior supraspinatus-anterior infraspinatus.  No fracture.    LONG BICIPITAL TENDON  The long head of the biceps tendon is normally situated within the  bicipital groove. No complete or partial biceps tendon tear  is  present.    GLENOHUMERAL JOINT  Joint fluid: Physiologic amount of joint fluid is  present.    Cartilage and subarticular bone:  No focal hyaline cartilage defects  are noted. No Hill-Sachs, reverse Hill-Sachs, or bony Bankart lesions  are seen.    Labrum: Limited assessment on this study with relative lack of joint  distention shows no labral tear.    ANCILLARY FINDINGS:  None      Impression    Impression:    1. Low to moderate grade bursal sided fraying of the mid infraspinatus  at the footprint.    2. Supraspinatus tendinosis without tear.    3. Focal edema within the greater tuberosity at the insertion site of  the posterior supraspinatus-anterior infraspinatus. No fracture. Bone  marrow edema may be related to contusion or enthesopathic change.    CELSO (Gaeg BERRY MD                        Again, thank you for allowing me to participate in the care of your patient.        Sincerely,        Hector Cloud, DO

## 2021-11-28 ENCOUNTER — HEALTH MAINTENANCE LETTER (OUTPATIENT)
Age: 36
End: 2021-11-28

## 2021-12-03 ENCOUNTER — THERAPY VISIT (OUTPATIENT)
Dept: PHYSICAL THERAPY | Facility: CLINIC | Age: 36
End: 2021-12-03
Payer: OTHER MISCELLANEOUS

## 2021-12-03 DIAGNOSIS — S49.91XD INJURY OF RIGHT SHOULDER, SUBSEQUENT ENCOUNTER: ICD-10-CM

## 2021-12-03 PROCEDURE — 97112 NEUROMUSCULAR REEDUCATION: CPT | Mod: GP | Performed by: PHYSICAL THERAPIST

## 2021-12-03 PROCEDURE — 97110 THERAPEUTIC EXERCISES: CPT | Mod: GP | Performed by: PHYSICAL THERAPIST

## 2021-12-03 NOTE — PROGRESS NOTES
Assessment/Plan:    PROGRESS  REPORT    Progress reporting period is from 11/9/2021 to 12/3/2021.       SUBJECTIVE  Subjective changes noted by patient:  Pt reports he notes no pain when moving it much anymore, just in certain positions or when trying to sleep. Pt notes that his shoulder is weak, difficulty lifting a 10# weight at home out to the right on the R shoulder but easy on the L. Pt agrees that he needs to work on strength.       Current pain level is 0/10  .     Previous pain level was  0/10 Initial Pain level: 0/10.   Changes in function:  None  Adverse reaction to treatment or activity: None    OBJECTIVE  Changes noted in objective findings:  Yes, Increased Strength: 3+/5 shoulder flexion, abduction and extension R. 4+/5 Shoulder flexion and abduction L, 4-/5 Shoulder extension L.        ASSESSMENT/PLAN  Updated problem list and treatment plan: Diagnosis 1:  R shoulder Pain  Pain -  self management, education and home program  Decreased strength - therapeutic exercise and therapeutic activities  STG/LTGs have been met or progress has been made towards goals:  Yes (See Goal flow sheet completed today.)  Assessment of Progress: The patient's condition is improving.  Self Management Plans:  Patient has been instructed in a home treatment program.  I have re-evaluated this patient and find that the nature, scope, duration and intensity of the therapy is appropriate for the medical condition of the patient.  Ric continues to require the following intervention to meet STG and LTG's:  PT    Recommendations:  This patient would benefit from continued therapy.     Frequency:  1 X week, once daily  Duration:  for 6 weeks total        Please refer to the daily flowsheet for treatment today, total treatment time and time spent performing 1:1 timed codes.

## 2021-12-07 ENCOUNTER — THERAPY VISIT (OUTPATIENT)
Dept: PHYSICAL THERAPY | Facility: CLINIC | Age: 36
End: 2021-12-07
Payer: OTHER MISCELLANEOUS

## 2021-12-07 DIAGNOSIS — S49.91XD INJURY OF RIGHT SHOULDER, SUBSEQUENT ENCOUNTER: ICD-10-CM

## 2021-12-07 PROCEDURE — 97112 NEUROMUSCULAR REEDUCATION: CPT | Mod: GP | Performed by: PHYSICAL THERAPIST

## 2021-12-07 PROCEDURE — 97110 THERAPEUTIC EXERCISES: CPT | Mod: GP | Performed by: PHYSICAL THERAPIST

## 2021-12-21 ENCOUNTER — THERAPY VISIT (OUTPATIENT)
Dept: PHYSICAL THERAPY | Facility: CLINIC | Age: 36
End: 2021-12-21
Payer: OTHER MISCELLANEOUS

## 2021-12-21 DIAGNOSIS — S49.91XD INJURY OF RIGHT SHOULDER, SUBSEQUENT ENCOUNTER: ICD-10-CM

## 2021-12-21 PROCEDURE — 97112 NEUROMUSCULAR REEDUCATION: CPT | Mod: GP | Performed by: PHYSICAL THERAPIST

## 2021-12-21 PROCEDURE — 97110 THERAPEUTIC EXERCISES: CPT | Mod: GP | Performed by: PHYSICAL THERAPIST

## 2021-12-28 ENCOUNTER — THERAPY VISIT (OUTPATIENT)
Dept: PHYSICAL THERAPY | Facility: CLINIC | Age: 36
End: 2021-12-28
Payer: OTHER MISCELLANEOUS

## 2021-12-28 DIAGNOSIS — S49.91XD INJURY OF RIGHT SHOULDER, SUBSEQUENT ENCOUNTER: ICD-10-CM

## 2021-12-28 PROCEDURE — 97110 THERAPEUTIC EXERCISES: CPT | Mod: GP | Performed by: PHYSICAL THERAPIST

## 2021-12-28 PROCEDURE — 97112 NEUROMUSCULAR REEDUCATION: CPT | Mod: GP | Performed by: PHYSICAL THERAPIST

## 2021-12-28 NOTE — PROGRESS NOTES
Assessment/Plan:    PROGRESS  REPORT    Progress reporting period is from 12/3/2021 to 12/28/2021.       SUBJECTIVE  Subjective changes noted by patient:  Subjective: pt reports he has been consistent with his exercises at home however he has had a tough time with shoveling as this creates move pain afterwards. Pt reports he is fearful of movement at times because he thinks it will start being painful.   Current pain level is 0/10 (at rest)     Changes in function:  None  Adverse reaction to treatment or activity: None    OBJECTIVE  Changes noted in objective findings:  No changes noted since previous progress report  Objective: MMT: Shoulder ER 4+/5 BUE, shoulder flexion R 3+/5 and shoulder abduction 3+/5 R. LUE 4+/5 flexion and abduction     ASSESSMENT/PLAN  Updated problem list and treatment plan: Diagnosis 1:  Shoulder Pain  Pain -  self management, education and home program  Decreased strength - therapeutic exercise and therapeutic activities  STG/LTGs have been met or progress has been made towards goals:  Yes (See Goal flow sheet completed today.)  Assessment of Progress: The patient's condition is unchanged.  Self Management Plans:  Patient has been instructed in a home treatment program.  I have re-evaluated this patient and find that the nature, scope, duration and intensity of the therapy is appropriate for the medical condition of the patient.  Ric continues to require the following intervention to meet STG and LTG's:  PT    Recommendations:  Pt would benefit from continued strengthening with PT. 1x per week x3 weeks per current plan of care.    Please refer to the daily flowsheet for treatment today, total treatment time and time spent performing 1:1 timed codes.

## 2022-01-10 ENCOUNTER — THERAPY VISIT (OUTPATIENT)
Dept: PHYSICAL THERAPY | Facility: CLINIC | Age: 37
End: 2022-01-10
Payer: OTHER MISCELLANEOUS

## 2022-01-10 ENCOUNTER — OFFICE VISIT (OUTPATIENT)
Dept: ORTHOPEDICS | Facility: CLINIC | Age: 37
End: 2022-01-10
Payer: OTHER MISCELLANEOUS

## 2022-01-10 VITALS
SYSTOLIC BLOOD PRESSURE: 128 MMHG | DIASTOLIC BLOOD PRESSURE: 82 MMHG | WEIGHT: 155 LBS | HEIGHT: 64 IN | BODY MASS INDEX: 26.46 KG/M2

## 2022-01-10 DIAGNOSIS — S49.91XD INJURY OF RIGHT SHOULDER, SUBSEQUENT ENCOUNTER: ICD-10-CM

## 2022-01-10 DIAGNOSIS — M75.81 TENDINITIS OF RIGHT ROTATOR CUFF: Primary | ICD-10-CM

## 2022-01-10 PROCEDURE — 97112 NEUROMUSCULAR REEDUCATION: CPT | Mod: GP | Performed by: PHYSICAL THERAPIST

## 2022-01-10 PROCEDURE — 97110 THERAPEUTIC EXERCISES: CPT | Mod: GP | Performed by: PHYSICAL THERAPIST

## 2022-01-10 PROCEDURE — 99213 OFFICE O/P EST LOW 20 MIN: CPT | Performed by: PEDIATRICS

## 2022-01-10 ASSESSMENT — MIFFLIN-ST. JEOR: SCORE: 1534.08

## 2022-01-10 NOTE — LETTER
"    1/10/2022         RE: Ric Izaguirre  [address]  [place] [address]        Dear Colleague,    Thank you for referring your patient, Ric Izaguirre, to the [facility]. Please see a copy of my visit note below.    ASSESSMENT & PLAN    Ric was seen today for recheck.    Diagnoses and all orders for this visit:    Tendinitis of right rotator cuff    Injury of right shoulder, subsequent encounter      Improving overall.  Reviewed options again for tx.  Continue with therapy. Still some progress to be made with strength in particular.  No indication to change course with repeat imaging, repeat injection.  Updated workability letter today, continue with current restrictions as he has been working.  Recheck 1 month, sooner if needed.        Hector Cloud DO  [facility]    SUBJECTIVE- Interim History January 10, 2022    Chief Complaint   Patient presents with     Right Shoulder - RECHECK       Ric Izaguirre is a 36 year old male who is seen in f/u up for    Tendinitis of right rotator cuff  Injury of right shoulder, subsequent encounter. Since last visit on 11/15/21 patient has continued with PT.  He does feel that there has been great improvement in his shoulder.   Does feel he can increase his restrictions.    -12/19/20 Now ~ 13 months from initial injury    PT notes reviewed. See therapy notes.      REVIEW OF SYSTEMS:  Review of Systems      OBJECTIVE:  /82   Ht 1.626 m (5' 4\")   Wt 70.3 kg (155 lb)   BMI 26.61 kg/m       GENERAL APPEARANCE: healthy, alert and no distress   GAIT: NORMAL    Additional deferred in lieu of discussion.    RADIOLOGY:  See previous notes.          Again, thank you for allowing me to participate in the care of your patient.        Sincerely,        Hector Cloud DO    "

## 2022-01-10 NOTE — PROGRESS NOTES
"ASSESSMENT & PLAN    Ric was seen today for recheck.    Diagnoses and all orders for this visit:    Tendinitis of right rotator cuff    Injury of right shoulder, subsequent encounter      Improving overall.  Reviewed options again for tx.  Continue with therapy. Still some progress to be made with strength in particular.  No indication to change course with repeat imaging, repeat injection.  Updated workability letter today, continue with current restrictions as he has been working.  Recheck 1 month, sooner if needed.        Hector Cloud DO  [facility]    SUBJECTIVE- Interim History January 10, 2022    Chief Complaint   Patient presents with     Right Shoulder - RECHECK       Ric Izaguirre is a 36 year old male who is seen in f/u up for    Tendinitis of right rotator cuff  Injury of right shoulder, subsequent encounter. Since last visit on 11/15/21 patient has continued with PT.  He does feel that there has been great improvement in his shoulder.   Does feel he can increase his restrictions.    -12/19/20 Now ~ 13 months from initial injury    PT notes reviewed. See therapy notes.      REVIEW OF SYSTEMS:  Review of Systems      OBJECTIVE:  /82   Ht 1.626 m (5' 4\")   Wt 70.3 kg (155 lb)   BMI 26.61 kg/m       GENERAL APPEARANCE: healthy, alert and no distress   GAIT: NORMAL    Additional deferred in lieu of discussion.    RADIOLOGY:  See previous notes.      "

## 2022-01-10 NOTE — LETTER
REPORT OF WORK ABILITY    NOTE TO EMPLOYEE: You must promptly provide a copy of this report to your  employer or worker's compensation insurer, and Qualified Rehabilitation Consultant.    Date: January 10, 2022                    Employee Name: Ric Izaguirre         YOB: 1985  Medical Record Number: [id number]   Soc.Sec.No: [id number]  Employer: None                Date of Injury: 12/19/20  Managed Care Organization / Insurance Company Name: UNKNOWN     Diagnosis: Right Shoulder injury, improving  Work Related: yes     MMI: NO  Permanent Partial Disability (PPD) likely: UNKNOWN     EMPLOYEE IS ABLE TO WORK: Return to work with restrictions on next available work date     RESTRICTIONS IF ANY:  Continue with current restrictions, to allow for sustained progress with physical therapy  Shoulder/Elbow:  right Avoid outstretched arms  Avoid repetitive motion  Avoid overhead work     OTHER RESTRICTIONS: None     TREATMENT PLAN/NOTES: Continue physical therapy, anticipate will complete the course in the next ~3-4 weeks, and follow up in approximately 1 month            Hector Cloud DO, CAQ

## 2022-01-24 ENCOUNTER — THERAPY VISIT (OUTPATIENT)
Dept: PHYSICAL THERAPY | Facility: CLINIC | Age: 37
End: 2022-01-24
Payer: OTHER MISCELLANEOUS

## 2022-01-24 DIAGNOSIS — M25.511 ACUTE PAIN OF RIGHT SHOULDER: ICD-10-CM

## 2022-01-24 DIAGNOSIS — M25.511 RIGHT ANTERIOR SHOULDER PAIN: Primary | ICD-10-CM

## 2022-01-24 PROCEDURE — 97110 THERAPEUTIC EXERCISES: CPT | Mod: GP | Performed by: PHYSICAL THERAPIST

## 2022-01-24 PROCEDURE — 97112 NEUROMUSCULAR REEDUCATION: CPT | Mod: GP | Performed by: PHYSICAL THERAPIST

## 2022-02-04 ENCOUNTER — OFFICE VISIT (OUTPATIENT)
Dept: ORTHOPEDICS | Facility: CLINIC | Age: 37
End: 2022-02-04
Payer: OTHER MISCELLANEOUS

## 2022-02-04 VITALS
DIASTOLIC BLOOD PRESSURE: 72 MMHG | BODY MASS INDEX: 25.78 KG/M2 | WEIGHT: 151 LBS | SYSTOLIC BLOOD PRESSURE: 124 MMHG | HEIGHT: 64 IN

## 2022-02-04 DIAGNOSIS — M75.81 TENDINITIS OF RIGHT ROTATOR CUFF: Primary | ICD-10-CM

## 2022-02-04 DIAGNOSIS — S49.91XD INJURY OF RIGHT SHOULDER, SUBSEQUENT ENCOUNTER: ICD-10-CM

## 2022-02-04 PROCEDURE — 99213 OFFICE O/P EST LOW 20 MIN: CPT | Performed by: PEDIATRICS

## 2022-02-04 ASSESSMENT — MIFFLIN-ST. JEOR: SCORE: 1515.93

## 2022-02-04 NOTE — LETTER
2/4/2022         RE: Ric Izaguirre  [address]  [place] [address]        Dear Colleague,    Thank you for referring your patient, Ric Izaguirre, to the [facility]. Please see a copy of my visit note below.    ASSESSMENT & PLAN    Ric was seen today for recheck.    Diagnoses and all orders for this visit:    Tendinitis of right rotator cuff    Injury of right shoulder, subsequent encounter      Sustained improvement.  He feels ready to be done with this process, though has a little apprehension about the shoulder, notes is not quite yet 100%, but again ready to move on.  Finish PT course.  Updated letter for work, no restrictions. Most recent 2 letters were not too restrictive anyway. Trial basis return to work no restrictions for 2 weeks.  If no contact from pt during that time, then anticipate MMI.  Follow-up is as needed.  Questions answered. Discussed signs and symptoms that may indicate more serious issues; the patient was instructed to seek appropriate care if noted. Michael indicates understanding of these issues and agrees with the plan.          Hector Cloud, DO  [facility]    SUBJECTIVE- Interim History February 4, 2022    Chief Complaint   Patient presents with     Right Shoulder - RECHECK       Ric Izaguirre is a 36 year old male who is seen in f/u up for    Tendinitis of right rotator cuff  Injury of right shoulder, subsequent encounter. Since last visit on 1/10/22 patient has attended 2 additional PT visits.  He believes he has at least 1 remaining.  He has continued with his current restrictions at work.  Does feel they are going well.   .  -12/19/20  Now ~ 14 months from initial injury    **  Still can get some pain with lying on that side overnight. However, overall is much improved. Can get up in morning if having overnight pain and do a few motions and pain improves.    PT notes  "reviewed. Pt doing advanced exercises, including strength.    Pt seen with C.      REVIEW OF SYSTEMS:  Review of Systems      OBJECTIVE:  /72   Ht 1.626 m (5' 4\")   Wt 68.5 kg (151 lb)   BMI 25.92 kg/m       GENERAL APPEARANCE: healthy, alert and no distress         RADIOLOGY:  None new.            Again, thank you for allowing me to participate in the care of your patient.        Sincerely,        Hector Cloud, DO    "

## 2022-02-04 NOTE — LETTER
REPORT OF WORK ABILITY    NOTE TO EMPLOYEE: You must promptly provide a copy of this report to your  employer or worker's compensation insurer, and Qualified Rehabilitation Consultant.    Date: February 4, 2022                    Employee Name: Ric Izaguirre         YOB: 1985  Medical Record Number: [id number]   Soc.Sec.No: [id number]  Employer: None                Date of Injury: 12/19/20  Managed Care Organization / Insurance Company Name: UNKNOWN     Diagnosis: Right Shoulder injury, improving  Work Related: yes     MMI: NO  Permanent Partial Disability (PPD) likely: UNKNOWN     EMPLOYEE IS ABLE TO WORK: Return to work with no restrictions on next scheduled work date, trial basis for 2 weeks.     RESTRICTIONS IF ANY:  None.       OTHER RESTRICTIONS: None     TREATMENT PLAN/NOTES: Continue physical therapy (1 visit remaining), and continue home exercises. If no contact from pt within 2 weeks of full return to work with no restrictions, would consider him to be at MMI.            Hector Cloud, , CAQ

## 2022-02-04 NOTE — PROGRESS NOTES
"ASSESSMENT & PLAN    Ric was seen today for recheck.    Diagnoses and all orders for this visit:    Tendinitis of right rotator cuff    Injury of right shoulder, subsequent encounter      Sustained improvement.  He feels ready to be done with this process, though has a little apprehension about the shoulder, notes is not quite yet 100%, but again ready to move on.  Finish PT course.  Updated letter for work, no restrictions. Most recent 2 letters were not too restrictive anyway. Trial basis return to work no restrictions for 2 weeks.  If no contact from pt during that time, then anticipate MMI.  Follow-up is as needed.  Questions answered. Discussed signs and symptoms that may indicate more serious issues; the patient was instructed to seek appropriate care if noted. Michael indicates understanding of these issues and agrees with the plan.          Hector Cloud [facility]    SUBJECTIVE- Interim History February 4, 2022    Chief Complaint   Patient presents with     Right Shoulder - RECHECK       Ric Izaguirre is a 36 year old male who is seen in f/u up for    Tendinitis of right rotator cuff  Injury of right shoulder, subsequent encounter. Since last visit on 1/10/22 patient has attended 2 additional PT visits.  He believes he has at least 1 remaining.  He has continued with his current restrictions at work.  Does feel they are going well.   .  -12/19/20  Now ~ 14 months from initial injury    **  Still can get some pain with lying on that side overnight. However, overall is much improved. Can get up in morning if having overnight pain and do a few motions and pain improves.    PT notes reviewed. Pt doing advanced exercises, including strength.    Pt seen with [facility].      REVIEW OF SYSTEMS:  Review of Systems      OBJECTIVE:  /72   Ht 1.626 m (5' 4\")   Wt 68.5 kg (151 lb)   BMI 25.92 kg/m       GENERAL APPEARANCE: healthy, alert and no distress "         RADIOLOGY:  None new.

## 2022-02-14 ENCOUNTER — THERAPY VISIT (OUTPATIENT)
Dept: PHYSICAL THERAPY | Facility: CLINIC | Age: 37
End: 2022-02-14
Payer: OTHER MISCELLANEOUS

## 2022-02-14 DIAGNOSIS — S49.91XD INJURY OF RIGHT SHOULDER, SUBSEQUENT ENCOUNTER: ICD-10-CM

## 2022-02-14 PROCEDURE — 97110 THERAPEUTIC EXERCISES: CPT | Mod: GP | Performed by: PHYSICAL THERAPIST

## 2022-02-14 PROCEDURE — 97530 THERAPEUTIC ACTIVITIES: CPT | Mod: GP | Performed by: PHYSICAL THERAPIST

## 2022-02-14 PROCEDURE — 97112 NEUROMUSCULAR REEDUCATION: CPT | Mod: GP | Performed by: PHYSICAL THERAPIST

## 2022-02-14 NOTE — PROGRESS NOTES
Subjective:  HPI  Physical Exam                    Objective:  System    Physical Exam    General     ROS    Assessment/Plan:    DISCHARGE REPORT    Progress reporting period is from 9/23/2021 to 2/14/2022.       SUBJECTIVE  The shoulder is doing ok.  I continue to have issues with sleep/relaxing on it.  Sometimes it comes as a muscle pull.  It does not stop me working.  Pain currently 0/10, worst it will get to is 7/10- sharp and intermittent.      Current pain level is 0/10 (none at rest today)  Changes in function:  Yes (See Goal flowsheet attached for changes in current functional level)  Adverse reaction to treatment or activity: activity - heavy lifting    OBJECTIVE  Changes noted in objective findings:  The objective findings below are from DOS 2/14/2022.    MMT: shoulder flexion 5/5, abd 4/5 with sharp pain, IR/ER 5/5, biceps 5/5.  HEP finalized.      Lifting reviewed with body mechanics and positioning for occupational duties.          ASSESSMENT/PLAN  Updated problem list and treatment plan: Diagnosis 1:  R shoulder pain    Pain -  home program  Decreased ROM/flexibility - manual therapy and therapeutic exercise  Decreased strength - therapeutic exercise and therapeutic activities  Decreased function - therapeutic activities  Impaired posture - neuro re-education  STG/LTGs have been met or progress has been made towards goals:  Yes (See Goal flow sheet completed today.)  Assessment of Progress: The patient's condition is improving.  The patient's condition has potential to improve.  Self Management Plans:  Patient is independent in a home treatment program.  Patient is independent in self management of symptoms.  I have re-evaluated this patient and find that the nature, scope, duration and intensity of the therapy is appropriate for the medical condition of the patient.  Ric continues to require the following intervention to meet STG and LTG's:  PT intervention is no longer required to meet  STG/LTG.    Recommendations:  This patient is ready to be discharged from therapy and continue their home treatment program.    Please refer to the daily flowsheet for treatment today, total treatment time and time spent performing 1:1 timed codes.

## 2022-03-18 ENCOUNTER — LAB REQUISITION (OUTPATIENT)
Dept: LAB | Facility: CLINIC | Age: 37
End: 2022-03-18
Payer: COMMERCIAL

## 2022-03-18 DIAGNOSIS — U07.1 COVID-19: ICD-10-CM

## 2022-03-18 PROCEDURE — U0005 INFEC AGEN DETEC AMPLI PROBE: HCPCS | Mod: ORL | Performed by: FAMILY MEDICINE

## 2022-03-18 PROCEDURE — U0005 INFEC AGEN DETEC AMPLI PROBE: HCPCS | Mod: ORL

## 2022-03-18 PROCEDURE — U0003 INFECTIOUS AGENT DETECTION BY NUCLEIC ACID (DNA OR RNA); SEVERE ACUTE RESPIRATORY SYNDROME CORONAVIRUS 2 (SARS-COV-2) (CORONAVIRUS DISEASE [COVID-19]), AMPLIFIED PROBE TECHNIQUE, MAKING USE OF HIGH THROUGHPUT TECHNOLOGIES AS DESCRIBED BY CMS-2020-01-R: HCPCS | Mod: ORL | Performed by: FAMILY MEDICINE

## 2022-03-19 LAB — SARS-COV-2 RNA RESP QL NAA+PROBE: NEGATIVE

## 2022-03-22 ENCOUNTER — TELEPHONE (OUTPATIENT)
Dept: ORTHOPEDICS | Facility: CLINIC | Age: 37
End: 2022-03-22
Payer: COMMERCIAL

## 2022-07-07 ENCOUNTER — OFFICE VISIT (OUTPATIENT)
Dept: FAMILY MEDICINE | Facility: CLINIC | Age: 37
End: 2022-07-07
Payer: COMMERCIAL

## 2022-07-07 VITALS
RESPIRATION RATE: 26 BRPM | SYSTOLIC BLOOD PRESSURE: 106 MMHG | WEIGHT: 148 LBS | BODY MASS INDEX: 25.27 KG/M2 | HEIGHT: 64 IN | HEART RATE: 71 BPM | DIASTOLIC BLOOD PRESSURE: 80 MMHG | TEMPERATURE: 97.2 F | OXYGEN SATURATION: 100 %

## 2022-07-07 DIAGNOSIS — Z00.00 ROUTINE GENERAL MEDICAL EXAMINATION AT A HEALTH CARE FACILITY: Primary | ICD-10-CM

## 2022-07-07 DIAGNOSIS — Z11.59 NEED FOR HEPATITIS C SCREENING TEST: ICD-10-CM

## 2022-07-07 PROCEDURE — 86803 HEPATITIS C AB TEST: CPT | Performed by: FAMILY MEDICINE

## 2022-07-07 PROCEDURE — 80053 COMPREHEN METABOLIC PANEL: CPT | Performed by: FAMILY MEDICINE

## 2022-07-07 PROCEDURE — 36415 COLL VENOUS BLD VENIPUNCTURE: CPT | Performed by: FAMILY MEDICINE

## 2022-07-07 PROCEDURE — 99395 PREV VISIT EST AGE 18-39: CPT | Performed by: FAMILY MEDICINE

## 2022-07-07 PROCEDURE — 80061 LIPID PANEL: CPT | Performed by: FAMILY MEDICINE

## 2022-07-07 ASSESSMENT — ENCOUNTER SYMPTOMS
PARESTHESIAS: 0
ARTHRALGIAS: 0
PALPITATIONS: 0
EYE PAIN: 0
JOINT SWELLING: 0
CONSTIPATION: 0
SHORTNESS OF BREATH: 0
DYSURIA: 0
MYALGIAS: 0
ABDOMINAL PAIN: 0
NERVOUS/ANXIOUS: 0
ALLERGIC/IMMUNOLOGIC NEGATIVE: 1
HEADACHES: 0
SORE THROAT: 0
NAUSEA: 0
DIZZINESS: 0
FEVER: 0
HEARTBURN: 0
HEMATOLOGIC/LYMPHATIC NEGATIVE: 1
DIARRHEA: 0
HEMATOCHEZIA: 0
HEMATURIA: 0
CHILLS: 0
FREQUENCY: 0
WEAKNESS: 0
COUGH: 0
ENDOCRINE NEGATIVE: 1

## 2022-07-07 ASSESSMENT — PAIN SCALES - GENERAL: PAINLEVEL: NO PAIN (0)

## 2022-07-07 NOTE — PROGRESS NOTES
SUBJECTIVE:   CC: Ric Izaguirre is an 37 year old male who presents for preventative health visit.   Doing well no concerns.  Declined COVID booster today.    Patient has been advised of split billing requirements and indicates understanding: Yes  Healthy Habits:     Getting at least 3 servings of Calcium per day:  Yes    Bi-annual eye exam:  NO    Dental care twice a year:  Yes    Sleep apnea or symptoms of sleep apnea:  None    Diet:  Regular (no restrictions)    Duration of exercise:  45-60 minutes    Taking medications regularly:  Yes    Barriers to taking medications:  None    Medication side effects:  Not applicable    PHQ-2 Total Score: 0    Additional concerns today:  No      Today's PHQ-2 Score:   PHQ-2 ( 1999 Pfizer) 7/7/2022   Q1: Little interest or pleasure in doing things 0   Q2: Feeling down, depressed or hopeless 0   PHQ-2 Score 0   PHQ-2 Total Score (12-17 Years)- Positive if 3 or more points; Administer PHQ-A if positive -   Q1: Little interest or pleasure in doing things Not at all   Q2: Feeling down, depressed or hopeless Not at all   PHQ-2 Score 0       Abuse: Current or Past(Physical, Sexual or Emotional)- No  Do you feel safe in your environment? Yes    Have you ever done Advance Care Planning? (For example, a Health Directive, POLST, or a discussion with a medical provider or your loved ones about your wishes): No, advance care planning information given to patient to review.  Patient declined advance care planning discussion at this time.    Social History     Tobacco Use     Smoking status: Never Smoker     Smokeless tobacco: Never Used   Substance Use Topics     Alcohol use: Yes     Comment: socially     If you drink alcohol do you typically have >3 drinks per day or >7 drinks per week? No    Alcohol Use 7/7/2022   Prescreen: >3 drinks/day or >7 drinks/week? Not Applicable   Prescreen: >3 drinks/day or >7 drinks/week? -   No flowsheet data found.    Last PSA: No results found for:  PSA    Reviewed orders with patient. Reviewed health maintenance and updated orders accordingly - Yes  Labs reviewed in EPIC  BP Readings from Last 3 Encounters:   07/07/22 106/80   02/04/22 124/72   01/10/22 128/82    Wt Readings from Last 3 Encounters:   07/07/22 67.1 kg (148 lb)   02/04/22 68.5 kg (151 lb)   01/10/22 70.3 kg (155 lb)                    Reviewed and updated as needed this visit by clinical staff   Tobacco  Allergies  Meds   Med Hx  Surg Hx  Fam Hx  Soc Hx          Reviewed and updated as needed this visit by Provider                   Past Medical History:   Diagnosis Date     Abscess     left lower lip      Past Surgical History:   Procedure Laterality Date     NO HISTORY OF SURGERY       OB History   No obstetric history on file.       Review of Systems   Constitutional: Negative for chills and fever.   HENT: Negative for congestion, ear pain, hearing loss and sore throat.    Eyes: Negative for pain and visual disturbance.   Respiratory: Negative for cough and shortness of breath.    Cardiovascular: Negative for chest pain, palpitations and peripheral edema.   Gastrointestinal: Negative for abdominal pain, constipation, diarrhea, heartburn, hematochezia and nausea.   Endocrine: Negative.    Genitourinary: Negative for dysuria, frequency, genital sores, hematuria, impotence, penile discharge and urgency.   Musculoskeletal: Negative for arthralgias, joint swelling and myalgias.   Skin: Negative for rash.   Allergic/Immunologic: Negative.    Neurological: Negative for dizziness, weakness, headaches and paresthesias.   Hematological: Negative.    Psychiatric/Behavioral: Negative for mood changes. The patient is not nervous/anxious.      CONSTITUTIONAL: NEGATIVE for fever, chills, change in weight  INTEGUMENTARY/SKIN: NEGATIVE for worrisome rashes, moles or lesions  EYES: NEGATIVE for vision changes or irritation  ENT: NEGATIVE for ear, mouth and throat problems  RESP: NEGATIVE for significant  "cough or SOB  CV: NEGATIVE for chest pain, palpitations or peripheral edema  GI: NEGATIVE for nausea, abdominal pain, heartburn, or change in bowel habits   male: negative for dysuria, hematuria, decreased urinary stream, erectile dysfunction, urethral discharge  MUSCULOSKELETAL: NEGATIVE for significant arthralgias or myalgia  NEURO: NEGATIVE for weakness, dizziness or paresthesias  ENDOCRINE: NEGATIVE for temperature intolerance, skin/hair changes  HEME/ALLERGY/IMMUNE: NEGATIVE for bleeding problems  PSYCHIATRIC: NEGATIVE for changes in mood or affect    OBJECTIVE:   Pulse 71   Temp 97.2  F (36.2  C) (Tympanic)   Resp 26   Ht 1.62 m (5' 3.78\")   Wt 67.1 kg (148 lb)   SpO2 100%   BMI 25.58 kg/m      Physical Exam  GENERAL: healthy, alert and no distress  HENT: ear canals and TM's normal, nose and mouth without ulcers or lesions  NECK: no adenopathy, no asymmetry, masses, or scars and thyroid normal to palpation  RESP: lungs clear to auscultation - no rales, rhonchi or wheezes  CV: regular rate and rhythm, normal S1 S2, no S3 or S4, no murmur, click or rub, no peripheral edema and peripheral pulses strong  ABDOMEN: soft, nontender, no hepatosplenomegaly, no masses and bowel sounds normal  MS: no gross musculoskeletal defects noted, no edema  SKIN: no suspicious lesions or rashes  NEURO: Normal strength and tone, mentation intact and speech normal  PSYCH: mentation appears normal, affect normal/bright    Diagnostic Test Results:  Labs reviewed in Epic  Orders Placed This Encounter   Procedures     REVIEW OF HEALTH MAINTENANCE PROTOCOL ORDERS     Hepatitis C Screen Reflex to HCV RNA Quant and Genotype     Lipid panel reflex to direct LDL Fasting     Comprehensive metabolic panel (BMP + Alb, Alk Phos, ALT, AST, Total. Bili, TP)       ASSESSMENT/PLAN:   (Z00.00) Routine general medical examination at a health care facility  (primary encounter diagnosis)  Comment: normal exam  Plan: Lipid panel reflex to direct " "LDL Fasting,         Comprehensive metabolic panel (BMP + Alb, Alk         Phos, ALT, AST, Total. Bili, TP)        Routine preventive care discussed.  Advised with diet, exercise,    1 year annual exam follow-up recommended    (Z11.59) Need for hepatitis C screening test  Comment:   Plan: Hepatitis C Screen Reflex to HCV RNA Quant and         Genotype        screening      Patient has been advised of split billing requirements and indicates understanding: Yes    COUNSELING:   Reviewed preventive health counseling, as reflected in patient instructions       Regular exercise       Healthy diet/nutrition       Immunizations    Declined: COVID booster due to Other               Consider Hep C screening for all patients one time for ages 18-79 years    Estimated body mass index is 25.58 kg/m  as calculated from the following:    Height as of this encounter: 1.62 m (5' 3.78\").    Weight as of this encounter: 67.1 kg (148 lb).     Weight management plan: Discussed healthy diet and exercise guidelines    He reports that he has never smoked. He has never used smokeless tobacco.      Counseling Resources:  ATP IV Guidelines  Pooled Cohorts Equation Calculator  FRAX Risk Assessment  ICSI Preventive Guidelines  Dietary Guidelines for Americans, 2010  USDA's MyPlate  ASA Prophylaxis  Lung CA Screening    Leela Bennett MD  [facility]  "

## 2022-07-08 LAB
ALBUMIN SERPL-MCNC: 4 G/DL (ref 3.4–5)
ALP SERPL-CCNC: 86 U/L (ref 40–150)
ALT SERPL W P-5'-P-CCNC: 27 U/L (ref 0–70)
ANION GAP SERPL CALCULATED.3IONS-SCNC: 5 MMOL/L (ref 3–14)
AST SERPL W P-5'-P-CCNC: 19 U/L (ref 0–45)
BILIRUB SERPL-MCNC: 0.4 MG/DL (ref 0.2–1.3)
BUN SERPL-MCNC: 10 MG/DL (ref 7–30)
CALCIUM SERPL-MCNC: 9.3 MG/DL (ref 8.5–10.1)
CHLORIDE BLD-SCNC: 105 MMOL/L (ref 94–109)
CHOLEST SERPL-MCNC: 132 MG/DL
CO2 SERPL-SCNC: 28 MMOL/L (ref 20–32)
CREAT SERPL-MCNC: 0.75 MG/DL (ref 0.66–1.25)
FASTING STATUS PATIENT QL REPORTED: YES
GFR SERPL CREATININE-BSD FRML MDRD: >90 ML/MIN/1.73M2
GLUCOSE BLD-MCNC: 102 MG/DL (ref 70–99)
HCV AB SERPL QL IA: NONREACTIVE
HDLC SERPL-MCNC: 48 MG/DL
LDLC SERPL CALC-MCNC: 74 MG/DL
NONHDLC SERPL-MCNC: 84 MG/DL
POTASSIUM BLD-SCNC: 4.1 MMOL/L (ref 3.4–5.3)
PROT SERPL-MCNC: 7.7 G/DL (ref 6.8–8.8)
SODIUM SERPL-SCNC: 138 MMOL/L (ref 133–144)
TRIGL SERPL-MCNC: 48 MG/DL

## 2022-09-11 ENCOUNTER — HEALTH MAINTENANCE LETTER (OUTPATIENT)
Age: 37
End: 2022-09-11

## 2022-11-17 NOTE — PATIENT INSTRUCTIONS
physical therapy  Ice/heat  Follow up here in 2 weeks to reevaluate  Yesenia Silva D.O.     [place]

## 2023-10-01 ENCOUNTER — HEALTH MAINTENANCE LETTER (OUTPATIENT)
Age: 38
End: 2023-10-01

## 2024-03-28 ENCOUNTER — OFFICE VISIT (OUTPATIENT)
Dept: FAMILY MEDICINE | Facility: CLINIC | Age: 39
End: 2024-03-28
Payer: COMMERCIAL

## 2024-03-28 VITALS
WEIGHT: 155 LBS | TEMPERATURE: 98.1 F | RESPIRATION RATE: 18 BRPM | SYSTOLIC BLOOD PRESSURE: 128 MMHG | BODY MASS INDEX: 26.46 KG/M2 | OXYGEN SATURATION: 100 % | HEIGHT: 64 IN | DIASTOLIC BLOOD PRESSURE: 80 MMHG | HEART RATE: 77 BPM

## 2024-03-28 DIAGNOSIS — K21.9 GASTROESOPHAGEAL REFLUX DISEASE WITHOUT ESOPHAGITIS: ICD-10-CM

## 2024-03-28 DIAGNOSIS — Z00.00 ROUTINE GENERAL MEDICAL EXAMINATION AT A HEALTH CARE FACILITY: Primary | ICD-10-CM

## 2024-03-28 DIAGNOSIS — K59.01 SLOW TRANSIT CONSTIPATION: ICD-10-CM

## 2024-03-28 LAB
ALBUMIN SERPL BCG-MCNC: 4.4 G/DL (ref 3.5–5.2)
ALP SERPL-CCNC: 77 U/L (ref 40–150)
ALT SERPL W P-5'-P-CCNC: 31 U/L (ref 0–70)
ANION GAP SERPL CALCULATED.3IONS-SCNC: 9 MMOL/L (ref 7–15)
AST SERPL W P-5'-P-CCNC: 32 U/L (ref 0–45)
BILIRUB SERPL-MCNC: 0.3 MG/DL
BUN SERPL-MCNC: 10.5 MG/DL (ref 6–20)
CALCIUM SERPL-MCNC: 9.1 MG/DL (ref 8.6–10)
CHLORIDE SERPL-SCNC: 104 MMOL/L (ref 98–107)
CHOLEST SERPL-MCNC: 129 MG/DL
CREAT SERPL-MCNC: 0.84 MG/DL (ref 0.67–1.17)
DEPRECATED HCO3 PLAS-SCNC: 27 MMOL/L (ref 22–29)
EGFRCR SERPLBLD CKD-EPI 2021: >90 ML/MIN/1.73M2
FASTING STATUS PATIENT QL REPORTED: NO
GLUCOSE SERPL-MCNC: 90 MG/DL (ref 70–99)
HDLC SERPL-MCNC: 41 MG/DL
LDLC SERPL CALC-MCNC: 79 MG/DL
NONHDLC SERPL-MCNC: 88 MG/DL
POTASSIUM SERPL-SCNC: 4.1 MMOL/L (ref 3.4–5.3)
PROT SERPL-MCNC: 7.3 G/DL (ref 6.4–8.3)
SODIUM SERPL-SCNC: 140 MMOL/L (ref 135–145)
TRIGL SERPL-MCNC: 43 MG/DL

## 2024-03-28 PROCEDURE — 36415 COLL VENOUS BLD VENIPUNCTURE: CPT | Performed by: FAMILY MEDICINE

## 2024-03-28 PROCEDURE — 80061 LIPID PANEL: CPT | Performed by: FAMILY MEDICINE

## 2024-03-28 PROCEDURE — 80053 COMPREHEN METABOLIC PANEL: CPT | Performed by: FAMILY MEDICINE

## 2024-03-28 PROCEDURE — 99396 PREV VISIT EST AGE 40-64: CPT | Performed by: FAMILY MEDICINE

## 2024-03-28 SDOH — HEALTH STABILITY: PHYSICAL HEALTH: ON AVERAGE, HOW MANY DAYS PER WEEK DO YOU ENGAGE IN MODERATE TO STRENUOUS EXERCISE (LIKE A BRISK WALK)?: 7 DAYS

## 2024-03-28 ASSESSMENT — SOCIAL DETERMINANTS OF HEALTH (SDOH): HOW OFTEN DO YOU GET TOGETHER WITH FRIENDS OR RELATIVES?: ONCE A WEEK

## 2024-03-28 ASSESSMENT — PAIN SCALES - GENERAL: PAINLEVEL: NO PAIN (0)

## 2024-03-28 NOTE — PROGRESS NOTES
"Preventive Care Visit  [facility]  Leela Bennett MD, Family Medicine  Mar 28, 2024      Assessment & Plan     Routine general medical examination at a health care facility   Discussed diet, exercise, wellness and other preventive recommendations related to health maintenance.   Follow up as needed for acute issues.    Physical exam recommended in one year.     - Comprehensive metabolic panel (BMP + Alb, Alk Phos, ALT, AST, Total. Bili, TP); Future  - Lipid panel reflex to direct LDL Fasting; Future    Gastroesophageal reflux disease without esophagitis  Diet modifications, avoid spicy and acidic food  Over the counter Prilosec as needed  - omeprazole (PRILOSEC) 20 MG DR capsule; Take 1 capsule (20 mg) by mouth daily    Slow transit constipation  Increase fiber and fluid intake  Increase physical activities.      BMI  Estimated body mass index is 27.03 kg/m  as calculated from the following:    Height as of this encounter: 1.613 m (5' 3.5\").    Weight as of this encounter: 70.3 kg (155 lb).   Weight management plan: Discussed healthy diet and exercise guidelines    Counseling  Appropriate preventive services were discussed with this patient, including applicable screening as appropriate for fall prevention, nutrition, physical activity, Tobacco-use cessation, weight loss and cognition.  Checklist reviewing preventive services available has been given to the patient.  Reviewed patient's diet, addressing concerns and/or questions.       Work on weight loss  Regular exercise    Subjective   Michael is a 39 year old, presenting for the following:  Physical  Comes for annual exam.  Patient reports his stool sometimes harder than normal, sometimes get heartburn especially with certain types of food.  He has no nausea, no vomiting, no diarrhea, no blood in urine or stool.        3/28/2024     8:23 AM   Additional Questions   Roomed by Talita FLORES CMA   Accompanied by N/A         3/28/2024     " [facility] Dosing Services: Antimicrobial Stewardship Daily Doc  Consult for dosing of vancomycin by Dr. Jennifer Peralta  Indication: Sepsis  End of therapy: 6/12/22    Ht Readings from Last 1 Encounters:   06/09/22 177.8 cm (70\")        Wt Readings from Last 1 Encounters:   06/09/22 118.1 kg (260 lb 6.4 oz)      Vancomycin therapy:  Current maintenance dose: 1250 mg IV every 12 hours   Dose calculated to approximate a therapeutic AUC of 400-600  Last trough level: 15.2 mcg/mL on 6/7/22 with corresponds to an     Plan: SCr stable. Vancomycin adjusted 6/7/22. Continue vancomycin 1250 mg IV q 12 hours. Stop date 6/12/22 per ID. Dose administration notes:   Doses given appropriately as scheduled    Date Dose & Interval Measured (mcg/mL) Extrapolated (mcg/mL)   6/7 1000 mg IV q12h 15.2 458                   Other Antimicrobial   (not dosed by pharmacist) Ceftriaxone 2 g IV every 12 hours   Cultures 6/5: blood: Negative (final)  6/5 blood: negative (final)   6/6: CSF Cx: NGSF   Serum Creatinine Lab Results   Component Value Date/Time    Creatinine 0.74 06/11/2022 03:43 AM    Creatinine (POC) 1.0 07/20/2018 01:08 PM       Creatinine Clearance Estimated Creatinine Clearance: 121 mL/min (based on SCr of 0.74 mg/dL).    Temp Temp: 97.8 °F (36.6 °C)     WBC Lab Results   Component Value Date/Time    WBC 5.3 06/10/2022 03:18 AM      H/H Lab Results   Component Value Date/Time    HGB 11.9 (L) 06/10/2022 03:18 AM      Platelets    Lab Results   Component Value Date/Time    PLATELET 383 54/56/9167 03:18 AM        Ernst Acuna RP Contact: [phone] 8:23 AM   Patient Reported Additional Medications   Patient reports taking the following new medications N/A        Health Care Directive  Patient does not have a Health Care Directive or Living Will: Discussed advance care planning with patient; however, patient declined at this time.          3/28/2024   General Health   How would you rate your overall physical health? Excellent   Feel stress (tense, anxious, or unable to sleep) Not at all         3/28/2024   Nutrition   Three or more servings of calcium each day? Yes   Diet: I don't know   How many servings of fruit and vegetables per day? 4 or more   How many sweetened beverages each day? (!) 2         3/28/2024   Exercise   Days per week of moderate/strenous exercise 7 days         3/28/2024   Social Factors   Frequency of gathering with friends or relatives Once a week   Worry food won't last until get money to buy more No   Food not last or not have enough money for food? No   Do you have housing?  Yes   Are you worried about losing your housing? No   Lack of transportation? No   Unable to get utilities (heat,electricity)? No         3/28/2024   Dental   Dentist two times every year? Yes            Today's PHQ-2 Score:       3/28/2024     8:09 AM   PHQ-2 ( 1999 Pfizer)   Q1: Little interest or pleasure in doing things 0   Q2: Feeling down, depressed or hopeless 0   PHQ-2 Score 0   Q1: Little interest or pleasure in doing things Not at all   Q2: Feeling down, depressed or hopeless Not at all   PHQ-2 Score 0           3/28/2024   Substance Use   Alcohol more than 3/day or more than 7/wk No   Do you use any other substances recreationally? No     Social History     Tobacco Use    Smoking status: Never     Passive exposure: Never    Smokeless tobacco: Never   Vaping Use    Vaping Use: Never used   Substance Use Topics    Alcohol use: Yes     Comment: socially    Drug use: No           3/28/2024   STI Screening   New sexual partner(s) since last STI/HIV test? No  "  ASCVD Risk   The 10-year ASCVD risk score (Chanel ALCANTARA, et al., 2019) is: 2.9%    Values used to calculate the score:      Age: 39 years      Sex: Male      Is Non- : Yes      Diabetic: No      Tobacco smoker: No      Systolic Blood Pressure: 128 mmHg      Is BP treated: No      HDL Cholesterol: 48 mg/dL      Total Cholesterol: 132 mg/dL        3/28/2024   Contraception/Family Planning   Questions about contraception or family planning No        Reviewed and updated as needed this visit by Provider      Problems               Past Medical History:   Diagnosis Date    Abscess     left lower lip    Gastroesophageal reflux disease without esophagitis      Past Surgical History:   Procedure Laterality Date    NO HISTORY OF SURGERY       OB History   No obstetric history on file.         Review of Systems  Constitutional, HEENT, cardiovascular, pulmonary, GI, , musculoskeletal, neuro, skin, endocrine and psych systems are negative, except as otherwise noted.     Objective    Exam  /80 (BP Location: Right arm, Patient Position: Chair, Cuff Size: Adult Regular)   Pulse 77   Temp 98.1  F (36.7  C) (Oral)   Resp 18   Ht 1.613 m (5' 3.5\")   Wt 70.3 kg (155 lb)   SpO2 100%   BMI 27.03 kg/m     Estimated body mass index is 27.03 kg/m  as calculated from the following:    Height as of this encounter: 1.613 m (5' 3.5\").    Weight as of this encounter: 70.3 kg (155 lb).    Physical Exam  GENERAL: alert and no distress  EYES: Eyes grossly normal to inspection, PERRL and conjunctivae and sclerae normal  HENT: ear canals and TM's normal, nose and mouth without ulcers or lesions  NECK: no adenopathy, no asymmetry, masses, or scars  RESP: lungs clear to auscultation - no rales, rhonchi or wheezes  CV: regular rate and rhythm, normal S1 S2, no S3 or S4, no murmur, click or rub, no peripheral edema  ABDOMEN: soft, nontender, no hepatosplenomegaly, no masses and bowel sounds normal  MS: no " gross musculoskeletal defects noted, no edema  SKIN: no suspicious lesions or rashes  NEURO: Normal strength and tone, mentation intact and speech normal  PSYCH: mentation appears normal, affect normal/bright    Orders Placed This Encounter   Procedures    REVIEW OF HEALTH MAINTENANCE PROTOCOL ORDERS    Comprehensive metabolic panel (BMP + Alb, Alk Phos, ALT, AST, Total. Bili, TP)    Lipid panel reflex to direct LDL Fasting        Signed Electronically by: Leela Bennett MD

## 2024-03-28 NOTE — PATIENT INSTRUCTIONS
Preventive Care Advice   This is general advice given by our system to help you stay healthy. However, your care team may have specific advice just for you. Please talk to your care team about your preventive care needs.  Nutrition  Eat 5 or more servings of fruits and vegetables each day.  Try wheat bread, brown rice and whole grain pasta (instead of white bread, rice, and pasta).  Get enough calcium and vitamin D. Check the label on foods and aim for 100% of the RDA (recommended daily allowance).  Lifestyle  Exercise at least 150 minutes each week   (30 minutes a day, 5 days a week).  Do muscle strengthening activities 2 days a week. These help control your weight and prevent disease.  No smoking.  Wear sunscreen to prevent skin cancer.  Have a dental exam and cleaning every 6 months.  Yearly exams  See your health care team every year to talk about:  Any changes in your health.  Any medicines your care team has prescribed.  Preventive care, family planning, and ways to prevent chronic diseases.  Shots (vaccines)   HPV shots (up to age 26), if you've never had them before.  Hepatitis B shots (up to age 59), if you've never had them before.  COVID-19 shot: Get this shot when it's due.  Flu shot: Get a flu shot every year.  Tetanus shot: Get a tetanus shot every 10 years.  Pneumococcal, hepatitis A, and RSV shots: Ask your care team if you need these based on your risk.  Shingles shot (for age 50 and up).  General health tests  Diabetes screening:  Starting at age 35, Get screened for diabetes at least every 3 years.  If you are younger than age 35, ask your care team if you should be screened for diabetes.  Cholesterol test: At age 39, start having a cholesterol test every 5 years, or more often if advised.  Bone density scan (DEXA): At age 50, ask your care team if you should have this scan for osteoporosis (brittle bones).  Hepatitis C: Get tested at least once in your life.  STIs (sexually transmitted  infections)  Before age 24: Ask your care team if you should be screened for STIs.  After age 24: Get screened for STIs if you're at risk. You are at risk for STIs (including HIV) if:  You are sexually active with more than one person.  You don't use condoms every time.  You or a partner was diagnosed with a sexually transmitted infection.  If you are at risk for HIV, ask about PrEP medicine to prevent HIV.  Get tested for HIV at least once in your life, whether you are at risk for HIV or not.  Cancer screening tests  Cervical cancer screening: If you have a cervix, begin getting regular cervical cancer screening tests at age 21. Most people who have regular screenings with normal results can stop after age 65. Talk about this with your provider.  Breast cancer scan (mammogram): If you've ever had breasts, begin having regular mammograms starting at age 40. This is a scan to check for breast cancer.  Colon cancer screening: It is important to start screening for colon cancer at age 45.  Have a colonoscopy test every 10 years (or more often if you're at risk) Or, ask your provider about stool tests like a FIT test every year or Cologuard test every 3 years.  To learn more about your testing options, visit: https://www.GiftMe/156245.pdf.  For help making a decision, visit: https://bit.ly/uv64056.  Prostate cancer screening test: If you have a prostate and are age 55 to 69, ask your provider if you would benefit from a yearly prostate cancer screening test.  Lung cancer screening: If you are a current or former smoker age 50 to 80, ask your care team if ongoing lung cancer screenings are right for you.  For informational purposes only. Not to replace the advice of your health care provider. Copyright   2023 [employer]. All rights reserved. Clinically reviewed by the [facility]. [employer] [id number] - REV 01/24.

## 2025-06-12 ENCOUNTER — OFFICE VISIT (OUTPATIENT)
Dept: URGENT CARE | Facility: URGENT CARE | Age: 40
End: 2025-06-12

## 2025-06-12 ENCOUNTER — ANCILLARY PROCEDURE (OUTPATIENT)
Dept: GENERAL RADIOLOGY | Facility: CLINIC | Age: 40
End: 2025-06-12
Attending: EMERGENCY MEDICINE

## 2025-06-12 VITALS
WEIGHT: 160.4 LBS | BODY MASS INDEX: 27.39 KG/M2 | TEMPERATURE: 97.4 F | OXYGEN SATURATION: 100 % | HEIGHT: 64 IN | RESPIRATION RATE: 16 BRPM | HEART RATE: 87 BPM | SYSTOLIC BLOOD PRESSURE: 122 MMHG | DIASTOLIC BLOOD PRESSURE: 83 MMHG

## 2025-06-12 DIAGNOSIS — S69.92XA INJURY OF FINGER OF LEFT HAND, INITIAL ENCOUNTER: Primary | ICD-10-CM

## 2025-06-12 DIAGNOSIS — S69.92XA INJURY OF FINGER OF LEFT HAND, INITIAL ENCOUNTER: ICD-10-CM

## 2025-06-12 PROCEDURE — 73140 X-RAY EXAM OF FINGER(S): CPT | Mod: TC | Performed by: RADIOLOGY

## 2025-06-12 NOTE — PROGRESS NOTES
"Assessment & Plan     Diagnosis:    ICD-10-CM    1. Injury of finger of left hand, initial encounter  S69.92XA XR Finger Left G/E 2 Views          Medical Decision Making:  Ric Izaguirre is a 40 year old male presents for evaluation after left index finger injury 6/3/2025 at work; feels he hyperextended it.  Signs and symptoms are consistent with a PIP joint sprain/strain injury.  A broad differential was considered including sprain, strain, fracture, tendon rupture, nerve impingement/compromise, referred pain.  X-ray negative for fracture or malalignment.  He has near full range of motion at the MCP, PIP and DIP joint.  Supportive outpatient management is indicated.  Rest, ice, and elevation treatment was discussed with the patient.  AlumaFoam finger splint provided for comfort, will follow up with PCP and/or orthopedics as needed. Patient verbalizes understanding and agreement with the plan including reasons to return. All questions answered.     Tl Calderon PA-C  [facility]    Subjective     Ric Izaguirre is a 40 year old male who presents to clinic today for the following health issues:  Chief Complaint   Patient presents with    Finger     Left index finger injured at work. Mild swelling and sharp pains. 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  He has had some's pain and swelling, this cuts come down a bit.  Still some pain with range of motion, but feels he can almost make a fist.  No numbness or tingling in the finger, open wounds or deformity noted.    Review of Systems    See HPI    Objective      Vitals: /83 (BP Location: Left arm, Cuff Size: Adult Large)   Pulse 87   Temp 97.4  F (36.3  C) (Tympanic)   Resp 16   Ht 1.626 m (5' 4\")   Wt 72.8 kg (160 lb 6.4 oz)   SpO2 100%   BMI 27.53 kg/m        Patient Vitals for the past 24 " "hrs:   BP Temp Temp src Pulse Resp SpO2 Height Weight   06/12/25 1342 122/83 97.4  F (36.3  C) Tympanic 87 16 100 % 1.626 m (5' 4\") 72.8 kg (160 lb 6.4 oz)       Vital signs reviewed by: Tl Calderon PA-C    Physical Exam   Constitutional: Patient is alert and cooperative. No acute distress.  Neurological: Alert and oriented x3. Strength and sensation are intact in the left index finger.  MSK/Skin: Slight tenderness to palpation at the left PIP joint.  Range of motion grossly normal at the MCP, PIP and DIP joints.  No swelling or gross deformity.  No erythema, warmth, fluctuance or areas of pointing.  Psychiatric:The patient has a normal mood and affect.     Labs/Imaging:    Finger x-ray, 2 views:  Magdiel index finger without signs of fracture or malalignment.  No dislocation.    Read by: JASON Briggs PA-C, June 12, 2025        "

## 2025-06-12 NOTE — PROGRESS NOTES
Urgent Care Clinic Visit  Chief Complaint   Patient presents with    Finger     Left index finger injured at work. Mild swelling and sharp pains. DOI 6/3/2025               6/12/2025     1:43 PM   Additional Questions   Roomed by Mary   Accompanied by Self         6/12/2025   Forms   Any forms needing to be completed Yes

## 2025-06-12 NOTE — LETTER
June 12, 2025      Ric Izaguirre  [address] [place] [facility] [address]        To Whom It May Concern:    Ric Izaguirre  was seen on 6/12/2025.  Please allow him to wear the finger brace at work due to injury.      Sincerely,        Tl Calderon PA-C    Electronically signed

## 2025-06-19 ENCOUNTER — OFFICE VISIT (OUTPATIENT)
Dept: FAMILY MEDICINE | Facility: CLINIC | Age: 40
End: 2025-06-19
Payer: COMMERCIAL

## 2025-06-19 VITALS
OXYGEN SATURATION: 100 % | HEART RATE: 88 BPM | RESPIRATION RATE: 16 BRPM | WEIGHT: 157 LBS | BODY MASS INDEX: 26.8 KG/M2 | DIASTOLIC BLOOD PRESSURE: 72 MMHG | SYSTOLIC BLOOD PRESSURE: 120 MMHG | HEIGHT: 64 IN | TEMPERATURE: 98 F

## 2025-06-19 DIAGNOSIS — Z00.00 ROUTINE GENERAL MEDICAL EXAMINATION AT A HEALTH CARE FACILITY: Primary | ICD-10-CM

## 2025-06-19 DIAGNOSIS — Z13.220 SCREENING, LIPID: ICD-10-CM

## 2025-06-19 DIAGNOSIS — K21.9 GASTROESOPHAGEAL REFLUX DISEASE WITHOUT ESOPHAGITIS: ICD-10-CM

## 2025-06-19 DIAGNOSIS — Z11.3 SCREEN FOR STD (SEXUALLY TRANSMITTED DISEASE): ICD-10-CM

## 2025-06-19 DIAGNOSIS — E55.9 VITAMIN D DEFICIENCY: ICD-10-CM

## 2025-06-19 DIAGNOSIS — R73.03 PREDIABETES: ICD-10-CM

## 2025-06-19 LAB
ALBUMIN SERPL BCG-MCNC: 4.3 G/DL (ref 3.5–5.2)
ALP SERPL-CCNC: 87 U/L (ref 40–150)
ALT SERPL W P-5'-P-CCNC: 34 U/L (ref 0–70)
ANION GAP SERPL CALCULATED.3IONS-SCNC: 9 MMOL/L (ref 7–15)
AST SERPL W P-5'-P-CCNC: 34 U/L (ref 0–45)
BASOPHILS # BLD AUTO: 0 10E3/UL (ref 0–0.2)
BASOPHILS NFR BLD AUTO: 0 %
BILIRUB SERPL-MCNC: 0.3 MG/DL
BUN SERPL-MCNC: 11.9 MG/DL (ref 6–20)
CALCIUM SERPL-MCNC: 9.5 MG/DL (ref 8.8–10.4)
CHLORIDE SERPL-SCNC: 105 MMOL/L (ref 98–107)
CHOLEST SERPL-MCNC: 146 MG/DL
CREAT SERPL-MCNC: 0.75 MG/DL (ref 0.67–1.17)
EGFRCR SERPLBLD CKD-EPI 2021: >90 ML/MIN/1.73M2
EOSINOPHIL # BLD AUTO: 0.1 10E3/UL (ref 0–0.7)
EOSINOPHIL NFR BLD AUTO: 2 %
ERYTHROCYTE [DISTWIDTH] IN BLOOD BY AUTOMATED COUNT: 15 % (ref 10–15)
EST. AVERAGE GLUCOSE BLD GHB EST-MCNC: 140 MG/DL
FASTING STATUS PATIENT QL REPORTED: ABNORMAL
FASTING STATUS PATIENT QL REPORTED: ABNORMAL
GLUCOSE SERPL-MCNC: 119 MG/DL (ref 70–99)
HBA1C MFR BLD: 6.5 % (ref 0–5.6)
HCO3 SERPL-SCNC: 25 MMOL/L (ref 22–29)
HCT VFR BLD AUTO: 42.6 % (ref 40–53)
HCV AB SERPL QL IA: NONREACTIVE
HDLC SERPL-MCNC: 37 MG/DL
HGB BLD-MCNC: 13.7 G/DL (ref 13.3–17.7)
HIV 1+2 AB+HIV1 P24 AG SERPL QL IA: NONREACTIVE
IMM GRANULOCYTES # BLD: 0 10E3/UL
IMM GRANULOCYTES NFR BLD: 0 %
LDLC SERPL CALC-MCNC: 95 MG/DL
LYMPHOCYTES # BLD AUTO: 2.2 10E3/UL (ref 0.8–5.3)
LYMPHOCYTES NFR BLD AUTO: 30 %
MCH RBC QN AUTO: 25.4 PG (ref 26.5–33)
MCHC RBC AUTO-ENTMCNC: 32.2 G/DL (ref 31.5–36.5)
MCV RBC AUTO: 79 FL (ref 78–100)
MONOCYTES # BLD AUTO: 0.6 10E3/UL (ref 0–1.3)
MONOCYTES NFR BLD AUTO: 9 %
NEUTROPHILS # BLD AUTO: 4.2 10E3/UL (ref 1.6–8.3)
NEUTROPHILS NFR BLD AUTO: 59 %
NONHDLC SERPL-MCNC: 109 MG/DL
PLATELET # BLD AUTO: 224 10E3/UL (ref 150–450)
POTASSIUM SERPL-SCNC: 4.2 MMOL/L (ref 3.4–5.3)
PROT SERPL-MCNC: 7.3 G/DL (ref 6.4–8.3)
RBC # BLD AUTO: 5.4 10E6/UL (ref 4.4–5.9)
SODIUM SERPL-SCNC: 139 MMOL/L (ref 135–145)
T PALLIDUM AB SER QL: NONREACTIVE
TRIGL SERPL-MCNC: 69 MG/DL
VIT D+METAB SERPL-MCNC: 21 NG/ML (ref 20–50)
WBC # BLD AUTO: 7.2 10E3/UL (ref 4–11)

## 2025-06-19 RX ORDER — OMEPRAZOLE 20 MG/1
20 CAPSULE, DELAYED RELEASE ORAL DAILY
Qty: 30 CAPSULE | Refills: 1 | Status: SHIPPED | OUTPATIENT
Start: 2025-06-19

## 2025-06-19 SDOH — HEALTH STABILITY: PHYSICAL HEALTH: ON AVERAGE, HOW MANY MINUTES DO YOU ENGAGE IN EXERCISE AT THIS LEVEL?: 0 MIN

## 2025-06-19 SDOH — HEALTH STABILITY: PHYSICAL HEALTH: ON AVERAGE, HOW MANY DAYS PER WEEK DO YOU ENGAGE IN MODERATE TO STRENUOUS EXERCISE (LIKE A BRISK WALK)?: 0 DAYS

## 2025-06-19 ASSESSMENT — PAIN SCALES - GENERAL: PAINLEVEL_OUTOF10: NO PAIN (0)

## 2025-06-19 ASSESSMENT — SOCIAL DETERMINANTS OF HEALTH (SDOH): HOW OFTEN DO YOU GET TOGETHER WITH FRIENDS OR RELATIVES?: TWICE A WEEK

## 2025-06-19 NOTE — PROGRESS NOTES
"Preventive Care Visit  [facility]  Yesenia Munsonivonbrian RicardoDO, Family Medicine  Jun 19, 2025      Assessment & Plan     Routine general medical examination at a health care facility  Advised rechecking labs in 3 months   - Comprehensive metabolic panel (BMP + Alb, Alk Phos, ALT, AST, Total. Bili, TP); Future  - CBC with platelets and differential; Future  - Comprehensive metabolic panel (BMP + Alb, Alk Phos, ALT, AST, Total. Bili, TP)  - CBC with platelets and differential    Prediabetes  He has been drinking pop daily, advised avoiding and recheck, he declined diabetes mellitus education referrral   - Hemoglobin A1c; Future  - Hemoglobin A1c    Gastroesophageal reflux disease without esophagitis   Med, avoid soda, spicy foods, alcohol  - omeprazole (PRILOSEC) 20 MG DR capsule; Take 1 capsule (20 mg) by mouth daily.    Vitamin D deficiency    - Vitamin D Deficiency; Future  - Vitamin D Deficiency    Screen for STD (sexually transmitted disease)  Asked for labs  - Treponema Abs w Reflex to RPR and Titer; Future  - HIV Antigen Antibody Combo; Future  - Hepatitis C antibody; Future  - NEISSERIA GONORRHOEA PCR  - CHLAMYDIA TRACHOMATIS PCR  - Treponema Abs w Reflex to RPR and Titer  - HIV Antigen Antibody Combo  - Hepatitis C antibody    Screening, lipid    - Lipid panel reflex to direct LDL Fasting; Future  - Lipid panel reflex to direct LDL Fasting    Patient has been advised of split billing requirements and indicates understanding: Yes    The longitudinal plan of care for the diagnosis(es)/condition(s) as documented were addressed during this visit. Due to the added complexity in care, I will continue to support Michael in the subsequent management and with ongoing continuity of care.      BMI  Estimated body mass index is 26.95 kg/m  as calculated from the following:    Height as of this encounter: 1.626 m (5' 4\").    Weight as of this encounter: 71.2 kg (157 lb).   Weight management plan: " Discussed healthy diet and exercise guidelines  Reviewed preventive health counseling, as reflected in patient instructions       Regular exercise       Healthy diet/nutrition       Vision screening       Hearing screening  Counseling  Appropriate preventive services were addressed with this patient via screening, questionnaire, or discussion as appropriate for fall prevention, nutrition, physical activity, Tobacco-use cessation, social engagement, weight loss and cognition.  Checklist reviewing preventive services available has been given to the patient.  Reviewed patient's diet, addressing concerns and/or questions.         Follow-up    Follow-up Visit   Expected date:  Jun 19, 2026 (Approximate)      Follow Up Appointment Details:     Follow-up with whom?: PCP    Follow-Up for what?: Adult Preventive    How?: In Person                 Subjective   Michael is a 40 year old, presenting for the following:  Physical        6/19/2025     8:13 AM   Additional Questions   Roomed by enrrique ALVARADO         Advance Care Planning    Discussed advance care planning with patient; informed AVS has link to [facility].        6/19/2025   General Health   How would you rate your overall physical health? Good   Feel stress (tense, anxious, or unable to sleep) Only a little   (!) STRESS CONCERN      6/19/2025   Nutrition   Three or more servings of calcium each day? Yes   Diet: Regular (no restrictions)   How many servings of fruit and vegetables per day? (!) 2-3   How many sweetened beverages each day? 0-1         6/19/2025   Exercise   Days per week of moderate/strenous exercise 0 days   Average minutes spent exercising at this level 0 min   (!) EXERCISE CONCERN      6/19/2025   Social Factors   Frequency of gathering with friends or relatives Twice a week   Worry food won't last until get money to buy more No   Food not last or not have enough money for food? No   Do you have housing? (Housing is defined as stable  "permanent housing and does not include staying outside in a car, in a tent, in an abandoned building, in an overnight shelter, or couch-surfing.) Yes   Are you worried about losing your housing? No   Lack of transportation? No   Unable to get utilities (heat,electricity)? No         6/19/2025   Dental   Dentist two times every year? Yes         Today's PHQ-2 Score:       6/19/2025     8:13 AM   PHQ-2 ( 1999 [employer])   Q1: Little interest or pleasure in doing things 0    Q2: Feeling down, depressed or hopeless 0    PHQ-2 Score 0    Q1: Little interest or pleasure in doing things Not at all   Q2: Feeling down, depressed or hopeless Not at all   PHQ-2 Score 0       Proxy-reported           6/19/2025   Substance Use   Alcohol more than 3/day or more than 7/wk No   Do you use any other substances recreationally? No     Social History     Tobacco Use    Smoking status: Never     Passive exposure: Never    Smokeless tobacco: Never   Vaping Use    Vaping status: Never Used   Substance Use Topics    Alcohol use: Yes     Comment: socially    Drug use: No           6/19/2025   STI Screening   New sexual partner(s) since last STI/HIV test? No   ASCVD Risk   The ASCVD Risk score (Chanel ALCANTARA, et al., 2019) failed to calculate for the following reasons:    The valid total cholesterol range is 130 to 320 mg/dL        6/19/2025   Contraception/Family Planning   Questions about contraception or family planning No   What are your periods like? Not currently having periods        Reviewed and updated as needed this visit by Provider                      Review of Systems  Constitutional, HEENT, cardiovascular, pulmonary, GI, , musculoskeletal, neuro, skin, endocrine and psych systems are negative, except as otherwise noted.     Objective    Exam  /72   Pulse 88   Temp 98  F (36.7  C) (Oral)   Resp 16   Ht 1.626 m (5' 4\")   Wt 71.2 kg (157 lb)   SpO2 100%   BMI 26.95 kg/m     Estimated body mass index is 26.95 " "kg/m  as calculated from the following:    Height as of this encounter: 1.626 m (5' 4\").    Weight as of this encounter: 71.2 kg (157 lb).    Physical Exam  GENERAL: alert and no distress  EYES: Eyes grossly normal to inspection, PERRL and conjunctivae and sclerae normal  HENT: ear canals and TM's normal, nose and mouth without ulcers or lesions  NECK: no adenopathy, no asymmetry, masses, or scars  RESP: lungs clear to auscultation - no rales, rhonchi or wheezes  CV: regular rate and rhythm, normal S1 S2, no S3 or S4, no murmur, click or rub, no peripheral edema  ABDOMEN: soft, nontender, no hepatosplenomegaly, no masses and bowel sounds normal  MS: no gross musculoskeletal defects noted, no edema  SKIN: no suspicious lesions or rashes  NEURO: Normal strength and tone, mentation intact and speech normal  PSYCH: mentation appears normal, affect normal/bright        Signed Electronically by: Yesenia Silva DO    "

## 2025-06-19 NOTE — PATIENT INSTRUCTIONS
You hafe diabetes mellitus now based on labs  Please cut out all pop  Follow up in 3 months   Make appoint dominic Silva D.O.    Patient Education   Preventive Care Advice   This is general advice given by our system to help you stay healthy. However, your care team may have specific advice just for you. Please talk to your care team about your preventive care needs.  Nutrition  Eat 5 or more servings of fruits and vegetables each day.  Try wheat bread, brown rice and whole grain pasta (instead of white bread, rice, and pasta).  Get enough calcium and vitamin D. Check the label on foods and aim for 100% of the RDA (recommended daily allowance).  Lifestyle  Exercise at least 150 minutes each week  (30 minutes a day, 5 days a week).  Do muscle strengthening activities 2 days a week. These help control your weight and prevent disease.  No smoking.  Wear sunscreen to prevent skin cancer.  Have a dental exam and cleaning every 6 months.  Yearly exams  See your health care team every year to talk about:  Any changes in your health.  Any medicines your care team has prescribed.  Preventive care, family planning, and ways to prevent chronic diseases.  Shots (vaccines)   HPV shots (up to age 26), if you've never had them before.  Hepatitis B shots (up to age 59), if you've never had them before.  COVID-19 shot: Get this shot when it's due.  Flu shot: Get a flu shot every year.  Tetanus shot: Get a tetanus shot every 10 years.  Pneumococcal, hepatitis A, and RSV shots: Ask your care team if you need these based on your risk.  Shingles shot (for age 50 and up)  General health tests  Diabetes screening:  Starting at age 35, Get screened for diabetes at least every 3 years.  If you are younger than age 35, ask your care team if you should be screened for diabetes.  Cholesterol test: At age 39, start having a cholesterol test every 5 years, or more often if advised.  Bone density scan (DEXA): At age 50, ask your care  team if you should have this scan for osteoporosis (brittle bones).  Hepatitis C: Get tested at least once in your life.  STIs (sexually transmitted infections)  Before age 24: Ask your care team if you should be screened for STIs.  After age 24: Get screened for STIs if you're at risk. You are at risk for STIs (including HIV) if:  You are sexually active with more than one person.  You don't use condoms every time.  You or a partner was diagnosed with a sexually transmitted infection.  If you are at risk for HIV, ask about PrEP medicine to prevent HIV.  Get tested for HIV at least once in your life, whether you are at risk for HIV or not.  Cancer screening tests  Cervical cancer screening: If you have a cervix, begin getting regular cervical cancer screening tests starting at age 21.  Breast cancer scan (mammogram): If you've ever had breasts, begin having regular mammograms starting at age 40. This is a scan to check for breast cancer.  Colon cancer screening: It is important to start screening for colon cancer at age 45.  Have a colonoscopy test every 10 years (or more often if you're at risk) Or, ask your provider about stool tests like a FIT test every year or Cologuard test every 3 years.  To learn more about your testing options, visit:   .  For help making a decision, visit:   https://bit.ly/dq62892.  Prostate cancer screening test: If you have a prostate, ask your care team if a prostate cancer screening test (PSA) at age 55 is right for you.  Lung cancer screening: If you are a current or former smoker ages 50 to 80, ask your care team if ongoing lung cancer screenings are right for you.  For informational purposes only. Not to replace the advice of your health care provider. Copyright   2023 [employer]. All rights reserved. Clinically reviewed by the [facility]. [employer] [id number] - REV 01/24.

## 2025-06-26 ENCOUNTER — RESULTS FOLLOW-UP (OUTPATIENT)
Dept: FAMILY MEDICINE | Facility: CLINIC | Age: 40
End: 2025-06-26